# Patient Record
Sex: FEMALE | Race: WHITE | NOT HISPANIC OR LATINO | Employment: PART TIME | URBAN - METROPOLITAN AREA
[De-identification: names, ages, dates, MRNs, and addresses within clinical notes are randomized per-mention and may not be internally consistent; named-entity substitution may affect disease eponyms.]

---

## 2019-12-23 ENCOUNTER — OFFICE VISIT (OUTPATIENT)
Dept: MEDICAL GROUP | Facility: PHYSICIAN GROUP | Age: 39
End: 2019-12-23
Payer: COMMERCIAL

## 2019-12-23 VITALS
WEIGHT: 182 LBS | HEIGHT: 62 IN | DIASTOLIC BLOOD PRESSURE: 68 MMHG | OXYGEN SATURATION: 98 % | HEART RATE: 80 BPM | SYSTOLIC BLOOD PRESSURE: 112 MMHG | BODY MASS INDEX: 33.49 KG/M2 | TEMPERATURE: 98.9 F

## 2019-12-23 DIAGNOSIS — Z12.4 SCREENING FOR CERVICAL CANCER: ICD-10-CM

## 2019-12-23 DIAGNOSIS — E66.9 OBESITY (BMI 30.0-34.9): ICD-10-CM

## 2019-12-23 DIAGNOSIS — R22.1 MASS IN NECK: ICD-10-CM

## 2019-12-23 DIAGNOSIS — Z13.220 LIPID SCREENING: ICD-10-CM

## 2019-12-23 DIAGNOSIS — Z23 NEED FOR VACCINATION: ICD-10-CM

## 2019-12-23 DIAGNOSIS — Z13.1 DIABETES MELLITUS SCREENING: ICD-10-CM

## 2019-12-23 DIAGNOSIS — Z30.431 ENCOUNTER FOR ROUTINE CHECKING OF INTRAUTERINE CONTRACEPTIVE DEVICE (IUD): ICD-10-CM

## 2019-12-23 PROBLEM — E66.811 OBESITY (BMI 30.0-34.9): Status: ACTIVE | Noted: 2019-12-23

## 2019-12-23 PROCEDURE — 90471 IMMUNIZATION ADMIN: CPT | Performed by: INTERNAL MEDICINE

## 2019-12-23 PROCEDURE — 99204 OFFICE O/P NEW MOD 45 MIN: CPT | Mod: 25 | Performed by: INTERNAL MEDICINE

## 2019-12-23 PROCEDURE — 90686 IIV4 VACC NO PRSV 0.5 ML IM: CPT | Performed by: INTERNAL MEDICINE

## 2019-12-23 RX ORDER — M-VIT,TX,IRON,MINS/CALC/FOLIC 27MG-0.4MG
1 TABLET ORAL DAILY
COMMUNITY
End: 2021-10-12

## 2019-12-23 ASSESSMENT — PATIENT HEALTH QUESTIONNAIRE - PHQ9: CLINICAL INTERPRETATION OF PHQ2 SCORE: 0

## 2019-12-23 NOTE — PROGRESS NOTES
New Patient    Chief Complaint   Patient presents with   • Annual Exam   • Establish Care       Subjective:     History of Present Illness: Patient is a 39 y.o. female who is here today to establish primary care, discuss with issue.    Obesity (BMI 30.0-34.9)  BMI of 33, weight of 182 pounds, patient mentioned is gaining weight secondary to no exercise in the fall.  Mention 3 meals, medium size, 50% vegetables, snacks few times on chips as well as fruit, she drink carbonated water 1 cans a day, 1 soda a week, 2 cups of coffee with creamer a day.  Mention her weight goal is 150 pounds.    Mass in neck  -Patient had a right-sided submandibular mass, for 4 years, not getting bigger, not painful, not tender, mention previous provider check clinically and were not concerned.  -Patient has a formal smoking history of 5 years, 1 pack/day, quit 8 years ago.  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    Screening for cervical cancer   Encounter for routine checking of intrauterine contraceptive device (IUD)  -She had a IUD inserted 5 years ago, not sure if it is time to remove with, she had a Pap smear 1/2-year ago mention normal.  Otherwise denies having vaginal symptoms including discharge.        Current Outpatient Medications on File Prior to Visit   Medication Sig Dispense Refill   • therapeutic multivitamin-minerals (THERAGRAN-M) Tab Take 1 Tab by mouth every day.     • Omega-3 Fatty Acids (OMEGA-3 FISH OIL PO) Take  by mouth.     • Loratadine (CLARITIN PO) Take  by mouth.       No current facility-administered medications on file prior to visit.      Allergies   Allergen Reactions   • Morphine Vomiting     Patient Active Problem List    Diagnosis Date Noted   • Obesity (BMI 30.0-34.9) 12/23/2019   • Screening for cervical cancer 12/23/2019   • Encounter for management of intrauterine contraceptive device (IUD) 12/23/2019   • Mass in neck 12/23/2019     Past Medical History:   Diagnosis Date  "  • Allergy     nausea/ vomiting     Past Surgical History:   Procedure Laterality Date   • PRIMARY C SECTION       Family History   Problem Relation Age of Onset   • Other Mother         hyperthyroidism   • Cancer Mother         skin    • Cancer Father         prostate, skin   • Heart Disease Father         CABG at age of 66   • No Known Problems Brother      Social History     Tobacco Use   • Smoking status: Never Smoker   • Smokeless tobacco: Never Used   Substance Use Topics   • Alcohol use: Yes     Comment: very rare   • Drug use: Not Currently     Comment: meth 8 years ago       ROS:     - Constitutional: Negative for fever, chills, and fatigue/generalized weakness.     - HEENT: Negative for headaches, vision changes, hearing changes, ear pain, sore throat, and neck pain.      - Respiratory: Negative for cough, sputum production, dyspnea and wheezing.    - Cardiovascular: Negative for chest pain, palpitations, orthopnea, and bilateral lower extremity edema.     - Gastrointestinal: Negative for heartburn, nausea, vomiting, abdominal pain, hematochezia, melena, diarrhea, constipation    - Genitourinary: Negative for dysuria, polyuria, hematuria, pyuria, urinary urgency, and urinary incontinence.    - Musculoskeletal: Negative for myalgias, back pain, and joint pain. .     - Neurological: Negative for dizziness, tingling, tremors, focal sensory deficit, focal weakness and headaches.     - Psychiatric/Behavioral: Negative for depression, suicidal/homicidal ideation and memory loss.       Physical Exam:     /68 (BP Location: Left arm, Patient Position: Sitting, BP Cuff Size: Large adult)   Pulse 80   Temp 37.2 °C (98.9 °F) (Temporal)   Ht 1.575 m (5' 2\")   Wt 82.6 kg (182 lb)   SpO2 98%   BMI 33.29 kg/m²   General: Normal appearing. No distress.  HENT: Normocephalic. Ears normal shape and contour, oropharynx is without erythema, edema or exudates.    Eyes: conjunctiva clear lids without ptosis, pupils " equal and reactive to light accommodation  Neck: Supple without JVD or bruit. Thyroid is not enlarged.  Right-sided submandibular mass, half inch, nontender, soft in consistency.  Pulmonary: Clear to ausculation.  Normal effort. No rales, ronchi, or wheezing.  Cardiovascular: Regular rate and rhythm without murmur. Radial pulses are intact, regular and symmetrical bilaterally.  Abdomen: Soft, nontender, nondistended. Normal bowel sounds.   Lymph: No cervical, submandibular or supraclavicular lymph nodes are palpable  Psych: Normal mood and affect. Alert and oriented x3        Assessment and Plan:     1. Obesity (BMI 30.0-34.9)  - Comp Metabolic Panel; Future  - HEMOGLOBIN A1C; Future  - Lipid Profile; Future  - TSH WITH REFLEX TO FT4; Future  - REFERRAL TO NUTRITION SERVICES  - CBC WITH DIFFERENTIAL; Future    -encourage eating healthy food, exercise regularly and avoid unhealthy food   - Discussed weight loss goal. Recommended portion control, watching carbs  -advised to join overweight [employer], use Jelli smartphone carmelita as well as watch weight watcher program to help losing Wt.        2. Need for vaccination  - Influenza Vaccine Quad Injection (PF)    3. Lipid screening  - Lipid Profile; Future    4. Diabetes mellitus screening  - HEMOGLOBIN A1C; Future    5. Screening for cervical cancer  6. Encounter for routine checking of intrauterine contraceptive device (IUD)  - REFERRAL TO GYNECOLOGY    7. Mass in neck  -Likely to be lymph node based on physical exam, however would like to rule out other possibilities.  - US-SOFT TISSUES OF HEAD - NECK; Future        Follow Up:      Return in about 4 months (around 4/23/2020) for follow up for eugeniah issue.    Please note that this dictation was created using voice recognition software. I have made every reasonable attempt to correct obvious errors, but I expect that there are errors of grammar and possibly content that I did not discover before finalizing the  note.    Signed by: Marybeth Rueda M.D.

## 2020-01-06 ENCOUNTER — HOSPITAL ENCOUNTER (OUTPATIENT)
Dept: RADIOLOGY | Facility: MEDICAL CENTER | Age: 40
End: 2020-01-06
Attending: INTERNAL MEDICINE
Payer: COMMERCIAL

## 2020-01-06 DIAGNOSIS — R22.1 MASS IN NECK: ICD-10-CM

## 2020-01-06 PROCEDURE — 76536 US EXAM OF HEAD AND NECK: CPT

## 2020-01-30 ENCOUNTER — HOSPITAL ENCOUNTER (OUTPATIENT)
Facility: MEDICAL CENTER | Age: 40
End: 2020-01-30
Attending: OBSTETRICS & GYNECOLOGY
Payer: COMMERCIAL

## 2020-01-30 ENCOUNTER — GYNECOLOGY VISIT (OUTPATIENT)
Dept: OBGYN | Facility: CLINIC | Age: 40
End: 2020-01-30
Payer: COMMERCIAL

## 2020-01-30 VITALS — DIASTOLIC BLOOD PRESSURE: 66 MMHG | SYSTOLIC BLOOD PRESSURE: 116 MMHG | WEIGHT: 185 LBS | BODY MASS INDEX: 33.84 KG/M2

## 2020-01-30 DIAGNOSIS — Z12.4 CERVICAL CANCER SCREENING: ICD-10-CM

## 2020-01-30 DIAGNOSIS — Z01.419 WELL WOMAN EXAM WITH ROUTINE GYNECOLOGICAL EXAM: ICD-10-CM

## 2020-01-30 DIAGNOSIS — T83.32XA INTRAUTERINE CONTRACEPTIVE DEVICE THREADS LOST, INITIAL ENCOUNTER: ICD-10-CM

## 2020-01-30 PROCEDURE — 99385 PREV VISIT NEW AGE 18-39: CPT | Performed by: OBSTETRICS & GYNECOLOGY

## 2020-01-30 PROCEDURE — 87624 HPV HI-RISK TYP POOLED RSLT: CPT

## 2020-01-30 PROCEDURE — 88175 CYTOPATH C/V AUTO FLUID REDO: CPT

## 2020-01-30 NOTE — PROGRESS NOTES
.  Well Woman Exam    CC: well woman exam      HPI: June Ortiz is a 39 y.o.  female who presents for her Annual Gynecologic Exam  Pt has no complaints.  Currently using Mirena IUD for contraception, thinks it is a Mirena and due out in April.  Has not had menstrual cycle with this until last month she had some spotting only.  Sexually active with 1 male partner.  Sometimes has more difficulty with lubrication and climaxing during intercourse.  Wondering if this may be related to perimenopause    [place] works part-time for [employer].  Grew up in [place] and went to [place].    Health Maintenance:  Pap: 2+yrs ago?        ROS:  Gen: denies fevers, general concerns  CV/resp: reports no concerns  Breasts: no masses/changes  GI: denies abd pain, GI concerns  : denies vaginal bleeding, discharge, pain, denies urinary complaints  Endo: Reports no significant weight changes, irregular menses, temperature intolerance, hotflashes/nightsweats  Psych: Reports no concerns about mood, feels well    OB History    Para Term  AB Living   4 2 2   2 2   SAB TAB Ectopic Molar Multiple Live Births   1 1       2      # Outcome Date GA Lbr Raza/2nd Weight Sex Delivery Anes PTL Lv   4 Term /15    M CS-LTranv   JOEY   3 Term /    F CS-LTranv   JOEY   2 SAB            1 TAB                GYN Hx:   Periods regular before mirena  Hx of genital HSV   Denies hx of abnl paps     Past Medical History:   Diagnosis Date   • Allergy     nausea/ vomiting       Past Surgical History:   Procedure Laterality Date   • PRIMARY C SECTION     c/s x2      Medications:   Current Outpatient Medications Ordered in Epic   Medication Sig Dispense Refill   • therapeutic multivitamin-minerals (THERAGRAN-M) Tab Take 1 Tab by mouth every day.     • Omega-3 Fatty Acids (OMEGA-3 FISH OIL PO) Take  by mouth.     • Loratadine (CLARITIN PO) Take  by mouth.       No current Epic-ordered  facility-administered medications on file.        Allergies: Morphine    Social History     Tobacco Use   • Smoking status: Never Smoker   • Smokeless tobacco: Never Used   Substance Use Topics   • Alcohol use: Not Currently   • Drug use: Not Currently     Comment: meth 8 years ago         Family History   Problem Relation Age of Onset   • Other Mother         hyperthyroidism   • Cancer Mother         skin    • Cancer Father         prostate, skin   • Heart Disease Father         CABG at age of 66   • No Known Problems Brother      Denies hx of GI/GYN/breast cancers      Physical Exam   /66 (BP Location: Right arm, Patient Position: Sitting)   Wt 83.9 kg (185 lb)   Breastfeeding? No   BMI 33.84 kg/m²   gen: AAO, NAD, affect appropriate  Neck: non-tender, no masses, no thyromegaly/nodules appreciated  CV: RRR; no LE edema  resp: ctab  Breast: symmetric, no skin changes, no masses, nontender, no nipple discharge, no lymphadenopathy  abd: soft, NT, ND, no masses, no organomegaly, no hernias  : NEFG, normal urethral meatus, normal anus/perineum, normal vagina and cervix; no strings visible.  Uterus small, anteverted, no adnexal masses/tenderness  Skin: warm/dry, no lesions    Breast and pelvic exam chaperoned by MA (AB).  Assessment:   39 y.o.  here for well woman exam  1. Cervical cancer screening  THINPREP PAP WITH HPV   2. Well woman exam with routine gynecological exam  REFERRAL TO GYNECOLOGY   3. Intrauterine contraceptive device threads lost, initial encounter  US-PELVIC COMPLETE (TRANSABDOMINAL/TRANSVAGINAL) (COMBO)       Plan:   Pap:collected     Lost IUD strings - will get pelvic US to ensure intrauterine location.  Discussed even without visible strings were typically able to get them out in the office.  Assuming appropriately placed on imaging, will return as planned for IUD exchange when new Mirena available.  If not intrauterine on ultrasound, to return sooner for discussion of IUD  location and management  Mirena IUD ordered today.    Recommend patient to try over-the-counter lubrication, discussed that women typically need more time with foreplay and arousal and may experience decrease lubrication with increasing age as well.    Return to clinic 1 to 2 months for IUD exchange, assuming IUD intrauterine on ultrasound    Adrianna Hernandez MD  [facility], Women's Health

## 2020-02-01 LAB
CYTOLOGY REG CYTOL: NORMAL
HPV HR 12 DNA CVX QL NAA+PROBE: NEGATIVE
HPV16 DNA SPEC QL NAA+PROBE: NEGATIVE
HPV18 DNA SPEC QL NAA+PROBE: NEGATIVE
SPECIMEN SOURCE: NORMAL

## 2020-02-12 ENCOUNTER — HOSPITAL ENCOUNTER (OUTPATIENT)
Dept: RADIOLOGY | Facility: MEDICAL CENTER | Age: 40
End: 2020-02-12
Attending: OBSTETRICS & GYNECOLOGY
Payer: COMMERCIAL

## 2020-02-12 DIAGNOSIS — T83.32XA INTRAUTERINE CONTRACEPTIVE DEVICE THREADS LOST, INITIAL ENCOUNTER: ICD-10-CM

## 2020-02-12 PROCEDURE — 76830 TRANSVAGINAL US NON-OB: CPT

## 2020-03-05 ENCOUNTER — HOSPITAL ENCOUNTER (OUTPATIENT)
Dept: LAB | Facility: MEDICAL CENTER | Age: 40
End: 2020-03-05
Attending: INTERNAL MEDICINE
Payer: COMMERCIAL

## 2020-03-05 DIAGNOSIS — Z13.220 LIPID SCREENING: ICD-10-CM

## 2020-03-05 DIAGNOSIS — Z13.1 DIABETES MELLITUS SCREENING: ICD-10-CM

## 2020-03-05 DIAGNOSIS — E66.9 OBESITY (BMI 30.0-34.9): ICD-10-CM

## 2020-03-05 LAB
ALBUMIN SERPL BCP-MCNC: 4.4 G/DL (ref 3.2–4.9)
ALBUMIN/GLOB SERPL: 1.5 G/DL
ALP SERPL-CCNC: 42 U/L (ref 30–99)
ALT SERPL-CCNC: 27 U/L (ref 2–50)
ANION GAP SERPL CALC-SCNC: 8 MMOL/L (ref 0–11.9)
AST SERPL-CCNC: 21 U/L (ref 12–45)
BASOPHILS # BLD AUTO: 0.4 % (ref 0–1.8)
BASOPHILS # BLD: 0.03 K/UL (ref 0–0.12)
BILIRUB SERPL-MCNC: 0.7 MG/DL (ref 0.1–1.5)
BUN SERPL-MCNC: 12 MG/DL (ref 8–22)
CALCIUM SERPL-MCNC: 9.3 MG/DL (ref 8.5–10.5)
CHLORIDE SERPL-SCNC: 106 MMOL/L (ref 96–112)
CHOLEST SERPL-MCNC: 159 MG/DL (ref 100–199)
CO2 SERPL-SCNC: 25 MMOL/L (ref 20–33)
CREAT SERPL-MCNC: 0.71 MG/DL (ref 0.5–1.4)
EOSINOPHIL # BLD AUTO: 0.15 K/UL (ref 0–0.51)
EOSINOPHIL NFR BLD: 2.2 % (ref 0–6.9)
ERYTHROCYTE [DISTWIDTH] IN BLOOD BY AUTOMATED COUNT: 41.1 FL (ref 35.9–50)
EST. AVERAGE GLUCOSE BLD GHB EST-MCNC: 108 MG/DL
FASTING STATUS PATIENT QL REPORTED: NORMAL
GLOBULIN SER CALC-MCNC: 2.9 G/DL (ref 1.9–3.5)
GLUCOSE SERPL-MCNC: 93 MG/DL (ref 65–99)
HBA1C MFR BLD: 5.4 % (ref 0–5.6)
HCT VFR BLD AUTO: 44.9 % (ref 37–47)
HDLC SERPL-MCNC: 42 MG/DL
HGB BLD-MCNC: 14.5 G/DL (ref 12–16)
IMM GRANULOCYTES # BLD AUTO: 0 K/UL (ref 0–0.11)
IMM GRANULOCYTES NFR BLD AUTO: 0 % (ref 0–0.9)
LDLC SERPL CALC-MCNC: 106 MG/DL
LYMPHOCYTES # BLD AUTO: 2.23 K/UL (ref 1–4.8)
LYMPHOCYTES NFR BLD: 33.1 % (ref 22–41)
MCH RBC QN AUTO: 29.3 PG (ref 27–33)
MCHC RBC AUTO-ENTMCNC: 32.3 G/DL (ref 33.6–35)
MCV RBC AUTO: 90.7 FL (ref 81.4–97.8)
MONOCYTES # BLD AUTO: 0.46 K/UL (ref 0–0.85)
MONOCYTES NFR BLD AUTO: 6.8 % (ref 0–13.4)
NEUTROPHILS # BLD AUTO: 3.86 K/UL (ref 2–7.15)
NEUTROPHILS NFR BLD: 57.5 % (ref 44–72)
NRBC # BLD AUTO: 0 K/UL
NRBC BLD-RTO: 0 /100 WBC
PLATELET # BLD AUTO: 311 K/UL (ref 164–446)
PMV BLD AUTO: 10.8 FL (ref 9–12.9)
POTASSIUM SERPL-SCNC: 4.1 MMOL/L (ref 3.6–5.5)
PROT SERPL-MCNC: 7.3 G/DL (ref 6–8.2)
RBC # BLD AUTO: 4.95 M/UL (ref 4.2–5.4)
SODIUM SERPL-SCNC: 139 MMOL/L (ref 135–145)
TRIGL SERPL-MCNC: 56 MG/DL (ref 0–149)
TSH SERPL DL<=0.005 MIU/L-ACNC: 1.47 UIU/ML (ref 0.38–5.33)
WBC # BLD AUTO: 6.7 K/UL (ref 4.8–10.8)

## 2020-03-05 PROCEDURE — 83036 HEMOGLOBIN GLYCOSYLATED A1C: CPT

## 2020-03-05 PROCEDURE — 80053 COMPREHEN METABOLIC PANEL: CPT

## 2020-03-05 PROCEDURE — 84443 ASSAY THYROID STIM HORMONE: CPT

## 2020-03-05 PROCEDURE — 36415 COLL VENOUS BLD VENIPUNCTURE: CPT

## 2020-03-05 PROCEDURE — 80061 LIPID PANEL: CPT

## 2020-03-05 PROCEDURE — 85025 COMPLETE CBC W/AUTO DIFF WBC: CPT

## 2020-04-28 ENCOUNTER — GYNECOLOGY VISIT (OUTPATIENT)
Dept: OBGYN | Facility: CLINIC | Age: 40
End: 2020-04-28
Payer: COMMERCIAL

## 2020-04-28 VITALS — WEIGHT: 185 LBS | BODY MASS INDEX: 33.84 KG/M2

## 2020-04-28 DIAGNOSIS — Z30.430 ENCOUNTER FOR IUD INSERTION: ICD-10-CM

## 2020-04-28 DIAGNOSIS — Z30.433 ENCOUNTER FOR REMOVAL AND REINSERTION OF IUD: Primary | ICD-10-CM

## 2020-04-28 PROCEDURE — 58300 INSERT INTRAUTERINE DEVICE: CPT | Performed by: OBSTETRICS & GYNECOLOGY

## 2020-04-28 PROCEDURE — 58301 REMOVE INTRAUTERINE DEVICE: CPT | Mod: 59 | Performed by: OBSTETRICS & GYNECOLOGY

## 2020-04-28 ASSESSMENT — FIBROSIS 4 INDEX: FIB4 SCORE: 0.48

## 2020-04-28 NOTE — PROCEDURES
IUD Removal  Date/Time: 2020 10:30 AM  Performed by: Adrianna Hernandez M.D.  Authorized by: Adrianna Hernandez M.D.     IUD Insertion  Date/Time: 2020 10:30 AM  Performed by: Adrianna Hernandez M.D.  Authorized by: Adrianna Hernandez M.D.         CC: desires IUD insertion    HPI:  39 y.o.  presents today for desired IUD insertion.  Pt today desires Mirena IUD.    No LMP recorded. Patient has had an implant.  Current contraception: mirena IUD, lost strings    UPT neg    Wt 83.9 kg (185 lb)   BMI 33.84 kg/m²    IUD Removal and Insertion:  The bimanual exam is performed the uterus is noted to be 8 weeks in size and is anteverted  A speculum was inserted into the vagina, the cervix was cleansed with Betadine swabs x3  Behrer Tenaculum was placed on the anterior lip of the cervix; strings not visible.  Dilators used to easily dilate cervix to allow passage of IUD hook.  IUD hook inserted into uterus however without retrieval of device.  serpentine IUD removal device passed into uterus and IUD removed easily, noted to be intact and discarded.  The IUD was loaded into   The uterus was sounded to a depth of 9cm  The IUD was released into the uterus.    The strings trimmed to approximately 2-3 cm  Tenaculum was removed from the cervix and hemostasis was noted.  The patient tolerated the procedure well    Lot: [id number]  Exp: 2022     A/P: 39 y.o.  s/p mirena IUD removal and insertion as above  - prior to insertion, risks of IUD reviewed with pt including risk of insertion including pain, bleeding, infection, uterine perforation (approximately 1.1000 or IUD insertion; also discussed risk with IUD removal given lost strings), as well as risks of IUD including pregnancy/contraception failure.  Also discussed likely changes to menstrual cycle with mirena IUD including decreased, irregular or absent menses.   All questions answered, consent signed.    Plan for f/u for IUD check in  1mos, earlier if concerns.      Adrianna Hernandez MD  [facility], Women's Health

## 2020-04-28 NOTE — PROGRESS NOTES
GYN:  Patient here for planned IUD exchange.  See procedure note for detail.    Adrianna Hernandez MD  [facility], Women's Health

## 2020-05-29 ENCOUNTER — GYNECOLOGY VISIT (OUTPATIENT)
Dept: OBGYN | Facility: CLINIC | Age: 40
End: 2020-05-29
Payer: COMMERCIAL

## 2020-05-29 VITALS — BODY MASS INDEX: 34.02 KG/M2 | DIASTOLIC BLOOD PRESSURE: 78 MMHG | WEIGHT: 186 LBS | SYSTOLIC BLOOD PRESSURE: 106 MMHG

## 2020-05-29 DIAGNOSIS — Z30.431 ENCOUNTER FOR ROUTINE CHECKING OF INTRAUTERINE CONTRACEPTIVE DEVICE (IUD): ICD-10-CM

## 2020-05-29 PROCEDURE — 99212 OFFICE O/P EST SF 10 MIN: CPT | Performed by: OBSTETRICS & GYNECOLOGY

## 2020-05-29 ASSESSMENT — FIBROSIS 4 INDEX: FIB4 SCORE: 0.48

## 2020-05-29 NOTE — PROGRESS NOTES
GYN Visit    CC: IUD check    HPI:  39 y.o.  s/p mirena IUD insertion on  with me for contraception.  Reports feeling well, denies vaginal bleeding.  No concerns today.  Has had intercourse with difficulty.      ROS:  GYN: denies ongoing vaginal bleeding, just started spotting., pelvic pain, urinary concerns.      Physical exam:  /78   Wt 84.4 kg (186 lb)   Gen: conversant, NAD  Abd: soft, NT, ND, no masses  : NEFG, normal vagina, urthethral meatus normal in appearance, cervix normal with +strings present    A/P: 39 y.o.  with mirena IUD in place  - IUD appears in place, no concerns today.      RTC prn or annually for well woman exam    Adrianna Hernandez MD  [facility], Women's Health

## 2020-10-22 ENCOUNTER — TELEPHONE (OUTPATIENT)
Dept: MEDICAL GROUP | Facility: PHYSICIAN GROUP | Age: 40
End: 2020-10-22

## 2020-10-22 DIAGNOSIS — Z20.822 EXPOSURE TO COVID-19 VIRUS: ICD-10-CM

## 2020-10-23 ENCOUNTER — HOSPITAL ENCOUNTER (OUTPATIENT)
Dept: LAB | Facility: MEDICAL CENTER | Age: 40
End: 2020-10-23
Attending: NURSE PRACTITIONER
Payer: COMMERCIAL

## 2020-10-23 DIAGNOSIS — Z20.822 EXPOSURE TO COVID-19 VIRUS: ICD-10-CM

## 2020-10-23 LAB — COVID ORDER STATUS COVID19: NORMAL

## 2020-10-23 PROCEDURE — U0003 INFECTIOUS AGENT DETECTION BY NUCLEIC ACID (DNA OR RNA); SEVERE ACUTE RESPIRATORY SYNDROME CORONAVIRUS 2 (SARS-COV-2) (CORONAVIRUS DISEASE [COVID-19]), AMPLIFIED PROBE TECHNIQUE, MAKING USE OF HIGH THROUGHPUT TECHNOLOGIES AS DESCRIBED BY CMS-2020-01-R: HCPCS

## 2020-10-23 PROCEDURE — C9803 HOPD COVID-19 SPEC COLLECT: HCPCS

## 2020-10-24 LAB
SARS-COV-2 RNA RESP QL NAA+PROBE: NOTDETECTED
SPECIMEN SOURCE: NORMAL

## 2020-11-18 ENCOUNTER — OFFICE VISIT (OUTPATIENT)
Dept: URGENT CARE | Facility: CLINIC | Age: 40
End: 2020-11-18
Payer: COMMERCIAL

## 2020-11-18 ENCOUNTER — HOSPITAL ENCOUNTER (OUTPATIENT)
Facility: MEDICAL CENTER | Age: 40
End: 2020-11-18
Attending: NURSE PRACTITIONER
Payer: COMMERCIAL

## 2020-11-18 VITALS
HEIGHT: 62 IN | HEART RATE: 86 BPM | DIASTOLIC BLOOD PRESSURE: 82 MMHG | BODY MASS INDEX: 33.31 KG/M2 | OXYGEN SATURATION: 96 % | TEMPERATURE: 97.6 F | RESPIRATION RATE: 16 BRPM | SYSTOLIC BLOOD PRESSURE: 114 MMHG | WEIGHT: 181 LBS

## 2020-11-18 DIAGNOSIS — B34.9 VIRAL ILLNESS: ICD-10-CM

## 2020-11-18 PROCEDURE — U0003 INFECTIOUS AGENT DETECTION BY NUCLEIC ACID (DNA OR RNA); SEVERE ACUTE RESPIRATORY SYNDROME CORONAVIRUS 2 (SARS-COV-2) (CORONAVIRUS DISEASE [COVID-19]), AMPLIFIED PROBE TECHNIQUE, MAKING USE OF HIGH THROUGHPUT TECHNOLOGIES AS DESCRIBED BY CMS-2020-01-R: HCPCS

## 2020-11-18 PROCEDURE — 99203 OFFICE O/P NEW LOW 30 MIN: CPT | Performed by: NURSE PRACTITIONER

## 2020-11-18 ASSESSMENT — FIBROSIS 4 INDEX: FIB4 SCORE: 0.49

## 2020-11-18 NOTE — PROGRESS NOTES
Chief Complaint   Patient presents with   • Sinus Problem     x 1 wk, runny nose,headaches,  chest feels heavy, lack of taste and smell, fatigue, diarrhea and nausea       HISTORY OF PRESENT ILLNESS: Patient is a 40 y.o. female who presents today due to symptoms which started 6 days ago. Pt reports nasal congestion, headache, nausea, shortness of breath, fever, chills, fatigue, diarrhea, loss of taste and smell, malaise, and body aches. Denies chest pain or wheezing. Denies h/o asthma/copd/CAP. No immunocompromise. Has tried OTC cold medications without significant relief of symptoms. No recent ABX use. No other aggravating or alleviating factors. Reports positive exposure to COVID-19,  positive.       Patient Active Problem List    Diagnosis Date Noted   • Obesity (BMI 30.0-34.9) 12/23/2019   • Screening for cervical cancer 12/23/2019   • Encounter for management of intrauterine contraceptive device (IUD) 12/23/2019   • Mass in neck 12/23/2019       Allergies:Morphine    Current Outpatient Medications Ordered in Epic   Medication Sig Dispense Refill   • therapeutic multivitamin-minerals (THERAGRAN-M) Tab Take 1 Tab by mouth every day.     • Omega-3 Fatty Acids (OMEGA-3 FISH OIL PO) Take  by mouth.     • Loratadine (CLARITIN PO) Take  by mouth.       No current Epic-ordered facility-administered medications on file.        Past Medical History:   Diagnosis Date   • Allergy     nausea/ vomiting       Social History     Tobacco Use   • Smoking status: Never Smoker   • Smokeless tobacco: Never Used   Substance Use Topics   • Alcohol use: Not Currently   • Drug use: Not Currently     Comment: meth 8 years ago       Family Status   Relation Name Status   • Mo  Alive   • Fa  Alive   • Bro  Alive     Family History   Problem Relation Age of Onset   • Other Mother         hyperthyroidism   • Cancer Mother         skin    • Cancer Father         prostate, skin   • Heart Disease Father         CABG at age of 66   •  "No Known Problems Brother        ROS:  Review of Systems   Constitutional: Positive for subjective fever, chills, fatigue, malaise. Negative for weight loss.  HENT: Positive for congestion, loss of taste and smell. Negative for ear pain, nosebleeds, and neck pain.    Eyes: Negative for vision changes.   Cardiovascular: Negative for chest pain, palpitations, orthopnea and leg swelling.   Respiratory: Positive for shortness of breath. Negative for cough, wheezing.   Gastrointestinal: Positive for nausea, diarrhea. Negative for abdominal pain, vomiting.  Skin: Negative for rash, diaphoresis.     Exam:  /82 (BP Location: Left arm, Patient Position: Sitting, BP Cuff Size: Large adult)   Pulse 86   Temp 36.4 °C (97.6 °F) (Temporal)   Resp 16   Ht 1.575 m (5' 2\")   Wt 82.1 kg (181 lb)   SpO2 96%   General: well-nourished, well-developed female in NAD  Head: normocephalic, atraumatic  Eyes: PERRLA, EOM within normal limits, no conjunctival injection, no scleral icterus, visual fields and acuity grossly intact.  Ears: normal shape and symmetry, no tenderness, no discharge. External canals are without any significant edema or erythema. Tympanic membranes are without any inflammation, no effusion. Gross auditory acuity is intact.  Nose: symmetrical without tenderness, mild discharge, erythema present bilateral nares. No sinus tenderness.   Mouth/Throat: reasonable hygiene, no exudates or tonsillar enlargement. Mild erythema present.   Neck: no masses, range of motion within normal limits, no tracheal deviation.  Lymph: mild cervical adenopathy. No supraclavicular adenopathy.   Neuro: alert and oriented. Cranial nerves 1-12 grossly intact.   Cardiovascular: regular rate and rhythm without murmurs, rubs, or gallops. No edema.   Pulmonary: no distress. Chest is symmetrical with respiration. No wheezes, crackles, or rhonchi.   Musculoskeletal: appropriate muscle tone, gait is stable.  GI: soft, non-tender, no guarding, " no hepatosplenomegaly.   Skin: warm, dry, intact, no clubbing, no cyanosis.   Psych: appropriate mood, affect, judgement.         Assessment/Plan:  1. Viral illness  COVID/SARS CoV-2 PCR           Discussed symptoms most likely viral, will test for COVID. Home quarantine advised. Discussed natural progression and sx care.  Rest, increase fluids, hand and respiratory hygiene. May take OTC medications as directed for symptom relief. STRICT ER precautions.   Supportive care, differential diagnoses, and indications for immediate follow-up discussed with patient.   Pathogenesis of diagnosis discussed including typical length and natural progression.  Instructed to return to clinic or nearest emergency department for any change in condition, further concerns, or worsening of symptoms.  Patient states understanding of the plan of care and discharge instructions.          KRYSTA Cross.

## 2020-11-19 DIAGNOSIS — B34.9 VIRAL ILLNESS: ICD-10-CM

## 2020-11-19 LAB
COVID ORDER STATUS COVID19: NORMAL
SARS-COV-2 RNA RESP QL NAA+PROBE: DETECTED
SPECIMEN SOURCE: ABNORMAL

## 2021-04-22 ENCOUNTER — GYNECOLOGY VISIT (OUTPATIENT)
Dept: OBGYN | Facility: CLINIC | Age: 41
End: 2021-04-22
Payer: COMMERCIAL

## 2021-04-22 VITALS — WEIGHT: 180 LBS | SYSTOLIC BLOOD PRESSURE: 120 MMHG | BODY MASS INDEX: 32.92 KG/M2 | DIASTOLIC BLOOD PRESSURE: 71 MMHG

## 2021-04-22 DIAGNOSIS — A60.04 HERPES SIMPLEX VULVOVAGINITIS: ICD-10-CM

## 2021-04-22 PROCEDURE — 99213 OFFICE O/P EST LOW 20 MIN: CPT | Performed by: OBSTETRICS & GYNECOLOGY

## 2021-04-22 RX ORDER — VALACYCLOVIR HYDROCHLORIDE 500 MG/1
500 TABLET, FILM COATED ORAL 2 TIMES DAILY
Qty: 30 TABLET | Refills: 0 | Status: SHIPPED | OUTPATIENT
Start: 2021-04-22 | End: 2021-10-12

## 2021-04-22 ASSESSMENT — FIBROSIS 4 INDEX: FIB4 SCORE: 0.49

## 2021-04-22 NOTE — PROGRESS NOTES
GYN Visit    CC: vulvar lesion/swelling.        HPI: June Ortiz is a 40 y.o.  female with history of genital HSV presenting with vulvar lesions as well as swelling in the groin area.  Patient reports her last outbreak was 15 years or so ago at least, no issues since then.  Thinks that the lesions on the outside may be HSV but also reports there is some swelling in her right groin for the last several days that is painful as well.    Mirena IUD in place since 2020        ROS:  Gen: denies fevers, general concerns  GI: denies abd pain, GI concerns  : See HPI       OB History    Para Term  AB Living   4 2 2   2 2   SAB TAB Ectopic Molar Multiple Live Births   1 1       2      # Outcome Date GA Lbr Raza/2nd Weight Sex Delivery Anes PTL Lv   4 Term 04/20/15    M CS-LTranv   JOEY   3 Term 12    F CS-LTranv   JOEY   2 SAB            1 TAB                  GYN Hx:   No menses w/ mirena  Hx genital HSV  Denies hx of abnl paps; last     Past Medical History:   Diagnosis Date   • Allergy     nausea/ vomiting       Past Surgical History:   Procedure Laterality Date   • PRIMARY C SECTION         Medications:   Current Outpatient Medications Ordered in Epic   Medication Sig Dispense Refill   • valACYclovir (VALTREX) 500 MG Tab Take 1 tablet by mouth 2 times a day. 30 tablet 0   • therapeutic multivitamin-minerals (THERAGRAN-M) Tab Take 1 Tab by mouth every day.     • Omega-3 Fatty Acids (OMEGA-3 FISH OIL PO) Take  by mouth.     • Loratadine (CLARITIN PO) Take  by mouth.       No current Epic-ordered facility-administered medications on file.       Allergies: Morphine          Physical Exam   /71   Wt 81.6 kg (180 lb)   BMI 32.92 kg/m²   gen: AAO, NAD, affect appropriate  : NEFG with small cluster of slightly ulcerated lesions above into the right of the clitoris, appear to be broken vesicles with excoriations.  Right groin without particular mass or fluctuance, but does feel  slightly larger in the inguinal crease than left side.  Nontender on my exam, normal urethral meatus, normal anus/perineum, normal vagina and cervix.    Skin: warm/dry, no lesions    Breast and pelvic exam chaperoned by MA (AB).  Assessment:   40 y.o.  with HSV  1. Herpes simplex vulvovaginitis         Plan:   Given history of HSV, lesions today consistent with that diagnosis.  Recommend antivirals to start now for 3 days and as needed with onset of symptoms in the future.  Discussed it is difficult to know whether she will have more frequent outbreaks in the future or if this may be the only one for some time.  Anticipate the swollen area she has noticed in her groin is related to the HSV recurrence given the coincidental onset of symptoms.  Recommend that she monitor this area, return if worsening symptoms or persistent abnormalities after resolution of genital lesions.    Patient expressed understanding, follow-up annually or as needed.  Valtrex prescription sent to pharmacy    Adrianna Hernandez MD  [facility], Women's Health

## 2021-04-22 NOTE — NON-PROVIDER
Pt states has an open wound that she would like checked and right inner thigh is swollen with a lump  Good # [phone]  Pharmacy verified.

## 2021-05-07 ENCOUNTER — OFFICE VISIT (OUTPATIENT)
Dept: MEDICAL GROUP | Facility: PHYSICIAN GROUP | Age: 41
End: 2021-05-07
Payer: COMMERCIAL

## 2021-05-07 VITALS
BODY MASS INDEX: 33.07 KG/M2 | WEIGHT: 179.7 LBS | SYSTOLIC BLOOD PRESSURE: 116 MMHG | TEMPERATURE: 97.6 F | HEIGHT: 62 IN | OXYGEN SATURATION: 96 % | DIASTOLIC BLOOD PRESSURE: 70 MMHG | HEART RATE: 70 BPM

## 2021-05-07 DIAGNOSIS — J35.8 TONSILLOLITH: ICD-10-CM

## 2021-05-07 DIAGNOSIS — Z23 NEED FOR VACCINATION: ICD-10-CM

## 2021-05-07 PROBLEM — R09.89 TONSIL SYMPTOM: Status: ACTIVE | Noted: 2021-05-07

## 2021-05-07 PROCEDURE — 90715 TDAP VACCINE 7 YRS/> IM: CPT | Performed by: NURSE PRACTITIONER

## 2021-05-07 PROCEDURE — 99212 OFFICE O/P EST SF 10 MIN: CPT | Mod: 25 | Performed by: NURSE PRACTITIONER

## 2021-05-07 PROCEDURE — 90471 IMMUNIZATION ADMIN: CPT | Performed by: NURSE PRACTITIONER

## 2021-05-07 ASSESSMENT — FIBROSIS 4 INDEX: FIB4 SCORE: 0.49

## 2021-05-07 ASSESSMENT — PATIENT HEALTH QUESTIONNAIRE - PHQ9: CLINICAL INTERPRETATION OF PHQ2 SCORE: 0

## 2021-05-07 NOTE — PROGRESS NOTES
Subjective  Chief Complaint  Chief Complaint   Patient presents with   • Oral Swelling     concerned for growth on tonsils     History of Present Illness  June Ortiz is a 40 y.o. female. This is an establish patient of Dr. Marybeth Rueda, and she is here today discuss the following:    Tonsil symptom  This is a new condition.  Onset/duration:  Last 1 week - intermittent  Location:  Left side of throat  Quality/severity:  Denies any pain; complains of discomfort and feels as though something is stuck  Timing/context:  Newest sore has been present for 2 days  Precipitating trauma:  swallowed pepperoni and it felt like it got caught on the back of her throat about two weeks ago  Associated symptoms:  None  -- She reports a white object to the back of her throat that arrived there about 1 week ago.  She reports that it fell off, may have swallowed it, but then has returned.     Past Medical History    Allergies: Morphine  Past Medical History:   Diagnosis Date   • Allergy     nausea/ vomiting     Current Outpatient Medications Ordered in Epic   Medication Sig Dispense Refill   • Fluticasone Propionate (FLONASE NA) Administer  into affected nostril(S).     • valACYclovir (VALTREX) 500 MG Tab Take 1 tablet by mouth 2 times a day. 30 tablet 0   • therapeutic multivitamin-minerals (THERAGRAN-M) Tab Take 1 Tab by mouth every day.     • Omega-3 Fatty Acids (OMEGA-3 FISH OIL PO) Take  by mouth.     • Loratadine (CLARITIN PO) Take  by mouth.       No current Epic-ordered facility-administered medications on file.     Review of Systems:   General: Negative for fever/chills.   Respiratory:  Negative for cough and dyspnea.    Cardiovascular:  Negative for chest pain and palpitations.  Gastrointestinal:  Negative for abdominal pain.   Skin:  Negative for rash.   Neurological:  Negative for headaches.     Objective  Physical Exam:   /70 (BP Location: Right arm, Patient Position: Sitting, BP Cuff Size: Adult)    "Pulse 70   Temp 36.4 °C (97.6 °F) (Temporal)   Ht 1.575 m (5' 2\")   Wt 81.5 kg (179 lb 11.2 oz)   SpO2 96%  Body mass index is 32.87 kg/m².  General: Alert, no distress, well-groomed.  Skin: Warm, dry, good turgor, no rashes in visible areas.  ENMT: Lips without lesions, good dentition, moist mucous membranes.  Throat:  There are two visible stones on her left tonsil.  No erythema or swelling observed.   Respiratory: Unlabored respiratory effort, no cough.  Musculoskeletal: Normal gait, moves all extremities.  Neuro: Grossly non-focal.   Psych: Alert and oriented x3, normal affect and mood.    Assessment/Plan  1. Tonillolith  This is a new condition.   Discussed with patient the appearance of two tonsil stones within her oral cavity.   Discussed occurrence of tonsil stones due to accumulation of debris or food.  Discussed salt water gargles to help alleviate discomfort and dislodge stone as well as coughing to help loosen the stones.  Discussed prevention of future stones, including good oral hygiene, including brushing tongue, salt water gargles, and staying hydrated.   Discussed these are self limiting.  Should stones become worsen or irritated, return to office for further evaluation.      2. Need for vaccination  - Tdap Vaccine =>8YO IM    Health Maintenance: Completed    Return if symptoms worsen or fail to improve.    Patient verbalized understanding and agreed to plan of care.  We have discussed to contact me with needs via MyChart or by phone if needed.      I have placed the above orders and discussed them with an approved delegating provider.  The MA is performing the above orders under the direction of Dr. Hannah DO.    Please note that this dictation was created using voice recognition software. I have made every reasonable attempt to correct obvious errors, but I expect that there are errors of grammar and possibly content that I did not discover before finalizing the note.    Beth Partida, " DANISHA  [facility]

## 2021-05-07 NOTE — ASSESSMENT & PLAN NOTE
This is a new condition.  Onset/duration:  Last 1 week - intermittent  Location:  Left side of throat  Quality/severity:  Denies any pain; complains of discomfort and feels as though something is stuck  Timing/context:  Newest sore has been present for 2 days  Precipitating trauma:  swallowed pepperoni and it felt like it got caught on the back of her throat about two weeks ago  Associated symptoms:  None  -- She reports a white object to the back of her throat that arrived there about 1 week ago.  She reports that it fell off, may have swallowed it, but then has returned.

## 2021-05-07 NOTE — PATIENT INSTRUCTIONS
-- Gargle with salt water to help with throat discomfort and dislodge tonsil stones.    -- Cough may also help to loosen the stones.   -- Preventative:  Good oral hygiene, including brushing your tongue; gargle salt water, staying hydrated.

## 2021-10-12 ENCOUNTER — OFFICE VISIT (OUTPATIENT)
Dept: URGENT CARE | Facility: PHYSICIAN GROUP | Age: 41
End: 2021-10-12
Payer: COMMERCIAL

## 2021-10-12 ENCOUNTER — HOSPITAL ENCOUNTER (OUTPATIENT)
Facility: MEDICAL CENTER | Age: 41
End: 2021-10-12
Attending: FAMILY MEDICINE
Payer: COMMERCIAL

## 2021-10-12 VITALS
HEART RATE: 104 BPM | SYSTOLIC BLOOD PRESSURE: 140 MMHG | BODY MASS INDEX: 34.96 KG/M2 | OXYGEN SATURATION: 97 % | RESPIRATION RATE: 16 BRPM | HEIGHT: 62 IN | DIASTOLIC BLOOD PRESSURE: 82 MMHG | WEIGHT: 190 LBS | TEMPERATURE: 99.2 F

## 2021-10-12 DIAGNOSIS — R50.9 FEVER, UNSPECIFIED FEVER CAUSE: ICD-10-CM

## 2021-10-12 DIAGNOSIS — Z20.822 SUSPECTED COVID-19 VIRUS INFECTION: ICD-10-CM

## 2021-10-12 DIAGNOSIS — R09.89 CHEST CONGESTION: ICD-10-CM

## 2021-10-12 LAB
EXTERNAL QUALITY CONTROL: NORMAL
FLUAV+FLUBV AG SPEC QL IA: NEGATIVE
INT CON NEG: NORMAL
INT CON NEG: NORMAL
INT CON POS: NORMAL
INT CON POS: NORMAL
S PYO AG THROAT QL: NEGATIVE
SARS-COV+SARS-COV-2 AG RESP QL IA.RAPID: NEGATIVE

## 2021-10-12 PROCEDURE — 87426 SARSCOV CORONAVIRUS AG IA: CPT | Performed by: FAMILY MEDICINE

## 2021-10-12 PROCEDURE — 99214 OFFICE O/P EST MOD 30 MIN: CPT | Mod: CS | Performed by: FAMILY MEDICINE

## 2021-10-12 PROCEDURE — U0005 INFEC AGEN DETEC AMPLI PROBE: HCPCS

## 2021-10-12 PROCEDURE — 87804 INFLUENZA ASSAY W/OPTIC: CPT | Performed by: FAMILY MEDICINE

## 2021-10-12 PROCEDURE — U0003 INFECTIOUS AGENT DETECTION BY NUCLEIC ACID (DNA OR RNA); SEVERE ACUTE RESPIRATORY SYNDROME CORONAVIRUS 2 (SARS-COV-2) (CORONAVIRUS DISEASE [COVID-19]), AMPLIFIED PROBE TECHNIQUE, MAKING USE OF HIGH THROUGHPUT TECHNOLOGIES AS DESCRIBED BY CMS-2020-01-R: HCPCS

## 2021-10-12 PROCEDURE — 87880 STREP A ASSAY W/OPTIC: CPT | Performed by: FAMILY MEDICINE

## 2021-10-12 RX ORDER — BENZONATATE 100 MG/1
100 CAPSULE ORAL 3 TIMES DAILY PRN
Qty: 30 CAPSULE | Refills: 0 | Status: SHIPPED | OUTPATIENT
Start: 2021-10-12 | End: 2021-11-16

## 2021-10-12 ASSESSMENT — ENCOUNTER SYMPTOMS
NAUSEA: 0
COUGH: 1
VOMITING: 0
MYALGIAS: 0
CHILLS: 0
FEVER: 1
SHORTNESS OF BREATH: 1
SORE THROAT: 0
DIZZINESS: 0

## 2021-10-12 ASSESSMENT — FIBROSIS 4 INDEX: FIB4 SCORE: 0.51

## 2021-10-13 DIAGNOSIS — Z20.822 SUSPECTED COVID-19 VIRUS INFECTION: ICD-10-CM

## 2021-10-13 LAB
COVID ORDER STATUS COVID19: NORMAL
SARS-COV-2 RNA RESP QL NAA+PROBE: NOTDETECTED
SPECIMEN SOURCE: NORMAL

## 2021-10-13 NOTE — PATIENT INSTRUCTIONS
INSTRUCTIONS FOR COVID-19 OR ANY OTHER INFECTIOUS RESPIRATORY ILLNESSES    The Centers for Disease Control and Prevention (CDC) states that early indications for COVID-19 include cough, shortness of breath, difficulty breathing, or at least two of the following symptoms: chills, shaking with chills, muscle pain, headache, sore throat, and loss of taste or smell. Symptoms can range from mild to severe and may appear up to two weeks after exposure to the virus.    The practice of self-isolation and quarantine helps protect the public and your family by  preventing exposure to people who have or may have a contagious disease. Please follow the prevention steps below as based on CDC guidelines:    WHEN TO STOP ISOLATION: Persons with COVID-19 or any other infectious respiratory illness who have symptoms and were advised to care for themselves at home may discontinue home isolation under the following conditions:  · At least 24 hours have passed since recovery defined as resolution of fever without the use of fever-reducing medications; AND,  · Improvement in respiratory symptoms (e.g., cough, shortness of breath); AND,  · At least 10 days have passed since symptoms first appeared and have had no subsequent illness.    MONITOR YOUR SYMPTOMS: If your illness is worsening, seek prompt medical attention. If you have a medical emergency and need to call 911, notify the dispatch personnel that you have, or are being evaluated for confirmed or suspected COVID-19 or another infectious respiratory illness. Wear a facemask if possible.    ACTIVITY RESTRICTION: restrict activities outside your home, except for getting medical care. Do not go to work, school, or public areas. Avoid using public transportation, ride-sharing, or taxis.    SCHEDULED MEDICAL APPOINTMENTS: Notify your provider that you have, or are being evaluated for, confirmed or suspected COVID-19 or another infectious respiratory. This will help the healthcare  provider’s office safely take care of you and keep other people from getting exposed or infected.    FACEMASKS, when to wear: Anytime you are away from your home or around other people or pets. If you are unable to wear one, maintain a minimum of 6 feet distancing from others.    LIVING ENVIRONMENT: Stay in a separate room from other people and pets. If possible, use a separate bathroom, have someone else care for your pets and avoid sharing household items. Any items used should be washed thoroughly with soap and water. Clean all “high-touch” surfaces every day. Use a household cleaning spray or wipe, according to the label instructions. High touch surfaces include (but are not limited to) counters, tabletops, doorknobs, bathroom fixtures, toilets, phones, keyboards, tablets, and bedside tables.     HAND WASHING: Frequently wash hands with soap and water for at least 20 seconds,  especially after blowing your nose, coughing, or sneezing; going to the bathroom; before and after interacting with pets; and before and after eating or preparing food. If hands are visibly dirty use soap and water. If soap and water are not available, use an alcohol-based hand  with at least 60% alcohol. Avoid touching your eyes, nose, and mouth with unwashed hands. Cover your coughs and sneezes with a tissue. Throw used tissues in a lined trash can. Immediately wash your hands.    ACTIVE/FACILITATED SELF-MONITORING: Follow instructions provided by your local health department or health professionals, as appropriate. When working with your local health department check their available hours.    [facility]   Phone Number   [facility] [phone]   [facility] [place] [phone]   [place] [address]   [place] [phone]     IF YOU HAVE CONFIRMED POSITIVE COVID-19:    Those who have completely recovered from COVID-19 may have immune-boosting antibodies in their plasma--called “convalescent plasma”--that could be  used to treat critically ill COVID19 patients.    [facility] is excited to begin working with [facility] on collecting convalescent plasma from  people who have recovered from COVID-19 as part of a program to treat patients infected with the virus. This FDA-approved “emergency investigational new drug” is a special blood product containing antibodies that may give patients an extra boost to fight the virus.    To be eligible to donate convalescent plasma, you must have a prior COVID-19 diagnosis documented by a laboratory test (or a positive test result for SARS-CoV-2 antibodies) and meet additional eligibility requirements.    If you are interested in donating convalescent plasma or have any additional questions, please contact the [facility] Convalescent Plasma  at [phone] or via e-mail at [email or web address].

## 2021-10-13 NOTE — PROGRESS NOTES
Subjective:   June Ortiz is a 41 y.o. female who presents for Fever (chills, bodyaches,elevated heart rate, sob, started this morning )        Fever   This is a new problem. The current episode started today. The problem occurs intermittently. Associated symptoms include congestion and coughing. Pertinent negatives include no nausea, rash, sore throat or vomiting. Associated symptoms comments: There has been community-wide COVID-19 exposure, the patient denies known direct COVID-19 exposure  .     PMH:  has a past medical history of Allergy. She also has no past medical history of Addisons disease (HCC), Adrenal disorder (HCC), Anemia, Anxiety, Arrhythmia, Arthritis, Asthma, Blood transfusion without reported diagnosis, Cancer (HCC), Cataract, CHF (congestive heart failure) ([facility]), Clotting disorder (HCC), COPD (chronic obstructive pulmonary disease) ([facility]), Cushings syndrome ([facility]), Depression, Diabetes ([facility]), Diabetic neuropathy (HCC), Encounter for long-term (current) use of other medications, GERD (gastroesophageal reflux disease), Glaucoma, Goiter, Head ache, Heart attack ([facility]), Heart murmur, HIV (human immunodeficiency virus infection) ([facility]), Hyperlipidemia, Hypertension, IBD (inflammatory bowel disease), Kidney disease, Meningitis, Migraine, Muscle disorder, Osteoporosis, Parathyroid disorder (HCC), Pituitary disease (HCC), Pulmonary emphysema (HCC), Seizure ([facility]), Sickle cell disease (HCC), Stroke ([facility]), Substance abuse ([facility]), Thyroid disease, Tuberculosis, or Urinary tract infection.  MEDS:   Current Outpatient Medications:   •  benzonatate (TESSALON) 100 MG Cap, Take 1 Capsule by mouth 3 times a day as needed for Cough., Disp: 30 Capsule, Rfl: 0  ALLERGIES:   Allergies   Allergen Reactions   • Morphine Vomiting     SURGHX:   Past Surgical History:   Procedure Laterality Date   • PRIMARY C SECTION       SOCHX:  reports that she has never smoked. She has never used smokeless tobacco. She reports  "previous alcohol use. She reports previous drug use.  FH:   Family History   Problem Relation Age of Onset   • Other Mother         hyperthyroidism   • Cancer Mother         skin    • Cancer Father         prostate, skin   • Heart Disease Father         CABG at age of 66   • No Known Problems Brother      Review of Systems   Constitutional: Positive for fever. Negative for chills.   HENT: Positive for congestion. Negative for sore throat.    Respiratory: Positive for cough and shortness of breath (With deep inspiration).    Gastrointestinal: Negative for nausea and vomiting.   Musculoskeletal: Negative for myalgias.   Skin: Negative for rash.   Neurological: Negative for dizziness.        Objective:   /82 (BP Location: Right arm, Patient Position: Sitting, BP Cuff Size: Large adult)   Pulse (!) 104   Temp 37.3 °C (99.2 °F) (Temporal)   Resp 16   Ht 1.575 m (5' 2\")   Wt 86.2 kg (190 lb)   SpO2 97%   BMI 34.75 kg/m²   Physical Exam  Vitals and nursing note reviewed.   Constitutional:       General: She is not in acute distress.     Appearance: She is well-developed.   HENT:      Head: Normocephalic and atraumatic.      Right Ear: External ear normal.      Left Ear: External ear normal.      Nose: Nose normal.      Mouth/Throat:      Mouth: Mucous membranes are moist.   Eyes:      Conjunctiva/sclera: Conjunctivae normal.   Cardiovascular:      Rate and Rhythm: Normal rate and regular rhythm.   Pulmonary:      Effort: Pulmonary effort is normal. No respiratory distress.      Breath sounds: Normal breath sounds. No wheezing or rhonchi.   Abdominal:      General: There is no distension.   Musculoskeletal:         General: Normal range of motion.   Skin:     General: Skin is warm and dry.   Neurological:      General: No focal deficit present.      Mental Status: She is alert and oriented to person, place, and time. Mental status is at baseline.      Gait: Gait (gait at baseline) normal.   Psychiatric:         " Judgment: Judgment normal.     POCT influenza: negative     POC rapid strep: Negative    Point-of-care rapid Covid: Negative        Assessment/Plan:   1. Suspected COVID-19 virus infection  - POCT SARS-COV Antigen AZEEM (Symptomatic Only)  - SARS-CoV-2 PCR (24 hour In-House): Collect NP swab in VTM; Future    2. Fever, unspecified fever cause  - POCT Rapid Strep A  - POCT Influenza A/B    3. Chest congestion  - benzonatate (TESSALON) 100 MG Cap; Take 1 Capsule by mouth 3 times a day as needed for Cough.  Dispense: 30 Capsule; Refill: 0    Advised routine [facility] social distancing guidelines, symptomatic and supportive measures      Medical Decision Making/Course:  In the course of preparing for this visit with review of the pertinent past medical history, recent and past clinic visits, current medications, and performing chart, immunization, medical history and medication reconciliation, and in the further course of obtaining the current history pertinent to the clinic visit today, performing an exam and evaluation, ordering and independently evaluating labs, tests, and/or procedures, prescribing any recommended new medications as noted above, providing any pertinent counseling and education and recommending further coordination of care, at least 20 minutes of total time were spent during this encounter.      Discussed close monitoring, return precautions, and supportive measures of maintaining adequate fluid hydration and caloric intake, relative rest and symptom management as needed for pain and/or fever.    Differential diagnosis, natural history, supportive care, and indications for immediate follow-up discussed.     Advised the patient to follow-up with the primary care physician for recheck, reevaluation, and consideration of further management.    Please note that this dictation was created using voice recognition software. I have worked with consultants from the vendor as well as technical experts from [facility] to optimize the interface. I have made every reasonable attempt to correct obvious errors, but I expect that there are errors of grammar and possibly content that I did not discover before finalizing the note.

## 2021-11-09 ENCOUNTER — APPOINTMENT (OUTPATIENT)
Dept: MEDICAL GROUP | Facility: PHYSICIAN GROUP | Age: 41
End: 2021-11-09
Payer: COMMERCIAL

## 2021-11-16 ENCOUNTER — OFFICE VISIT (OUTPATIENT)
Dept: MEDICAL GROUP | Facility: PHYSICIAN GROUP | Age: 41
End: 2021-11-16
Payer: COMMERCIAL

## 2021-11-16 VITALS
OXYGEN SATURATION: 94 % | TEMPERATURE: 98.9 F | HEIGHT: 61 IN | DIASTOLIC BLOOD PRESSURE: 64 MMHG | WEIGHT: 190.4 LBS | SYSTOLIC BLOOD PRESSURE: 122 MMHG | BODY MASS INDEX: 35.95 KG/M2 | HEART RATE: 99 BPM

## 2021-11-16 DIAGNOSIS — E66.9 OBESITY (BMI 30.0-34.9): ICD-10-CM

## 2021-11-16 DIAGNOSIS — Z13.29 SCREENING FOR THYROID DISORDER: ICD-10-CM

## 2021-11-16 DIAGNOSIS — Z13.21 ENCOUNTER FOR VITAMIN DEFICIENCY SCREENING: ICD-10-CM

## 2021-11-16 DIAGNOSIS — Z13.220 LIPID SCREENING: ICD-10-CM

## 2021-11-16 DIAGNOSIS — Z00.00 HEALTH CARE MAINTENANCE: ICD-10-CM

## 2021-11-16 DIAGNOSIS — Z13.1 DIABETES MELLITUS SCREENING: ICD-10-CM

## 2021-11-16 PROBLEM — R09.89 TONSIL SYMPTOM: Status: RESOLVED | Noted: 2021-05-07 | Resolved: 2021-11-16

## 2021-11-16 PROBLEM — R22.1 MASS IN NECK: Status: RESOLVED | Noted: 2019-12-23 | Resolved: 2021-11-16

## 2021-11-16 PROCEDURE — 99395 PREV VISIT EST AGE 18-39: CPT | Performed by: INTERNAL MEDICINE

## 2021-11-16 ASSESSMENT — FIBROSIS 4 INDEX: FIB4 SCORE: 0.51

## 2021-11-16 NOTE — PROGRESS NOTES
"Established Patient    Chief Complaint   Patient presents with   • Annual Exam       Subjective:     HPI:   June presents today with the following.    6. Obesity (BMI 30.0-34.9)  Patient would like to have a labs, she does not have any real concern besides some weight gain and weight management, she is working at home as well with not too much exercise and other regular activities    Patient Active Problem List    Diagnosis Date Noted   • Obesity (BMI 30.0-34.9) 12/23/2019   • Screening for cervical cancer 12/23/2019   • Encounter for management of intrauterine contraceptive device (IUD) 12/23/2019       Current Outpatient Medications on File Prior to Visit   Medication Sig Dispense Refill   • Multiple Vitamins-Minerals (MULTI-VITAMIN GUMMIES PO) Take  by mouth.       No current facility-administered medications on file prior to visit.       Allergies, past medical history, past surgical history, family history, social history reviewed and updated    ROS:  All other systems reviewed and are negative except as stated in the HPI       Physical Exam:     /64 (BP Location: Right arm, Patient Position: Sitting, BP Cuff Size: Adult)   Pulse 99   Temp 37.2 °C (98.9 °F) (Temporal)   Ht 1.56 m (5' 1.42\")   Wt 86.4 kg (190 lb 6.4 oz)   SpO2 94%   BMI 35.49 kg/m²   General: Normal appearing. No distress  Pulmonary: Clear to ausculation.  Normal effort.   Cardiovascular: Regular rate and rhythm    Assessment and Plan:     41 y.o. female with the following issues.    1. Screening for thyroid disorder  - FREE THYROXINE; Future  - TSH; Future  - THYROID PEROXIDASE  (TPO) AB; Future  - T3 FREE; Future    2. Encounter for vitamin deficiency screening  - VITAMIN D,25 HYDROXY; Future  - VITAMIN B12; Future    3. Health care maintenance  - Comp Metabolic Panel; Future  - CRP HIGH SENSITIVE (CARDIAC); Future  - MAGNESIUM; Future    4. Diabetes mellitus screening  - HEMOGLOBIN A1C; Future  - INSULIN FASTING; Future    5. " Lipid screening  - LipoFit by NMR; Future    6. Obesity (BMI 30.0-34.9)  - Comp Metabolic Panel; Future  - CRP HIGH SENSITIVE (CARDIAC); Future  - FREE THYROXINE; Future  - TSH; Future  - THYROID PEROXIDASE  (TPO) AB; Future  - T3 FREE; Future    > Patient is also following up with gynecology for Pap smear and other gynecological wellness, reported is up-to-date with the Pap smear    Follow Up:      Return in about 4 weeks (around 12/14/2021).  Labs  Please note that this dictation was created using voice recognition software. I have made every reasonable attempt to correct obvious errors, but I expect that there are errors of grammar and possibly content that I did not discover before finalizing the note.    Signed by: Marybeth Rueda M.D.

## 2021-11-23 ENCOUNTER — HOSPITAL ENCOUNTER (OUTPATIENT)
Dept: LAB | Facility: MEDICAL CENTER | Age: 41
End: 2021-11-23
Attending: INTERNAL MEDICINE
Payer: COMMERCIAL

## 2021-11-23 DIAGNOSIS — Z13.29 SCREENING FOR THYROID DISORDER: ICD-10-CM

## 2021-11-23 DIAGNOSIS — Z13.1 DIABETES MELLITUS SCREENING: ICD-10-CM

## 2021-11-23 DIAGNOSIS — E66.9 OBESITY (BMI 30.0-34.9): ICD-10-CM

## 2021-11-23 DIAGNOSIS — Z00.00 HEALTH CARE MAINTENANCE: ICD-10-CM

## 2021-11-23 DIAGNOSIS — Z13.220 LIPID SCREENING: ICD-10-CM

## 2021-11-23 DIAGNOSIS — Z13.21 ENCOUNTER FOR VITAMIN DEFICIENCY SCREENING: ICD-10-CM

## 2021-11-23 LAB
CRP SERPL HS-MCNC: 5.1 MG/L (ref 0–3)
EST. AVERAGE GLUCOSE BLD GHB EST-MCNC: 100 MG/DL
FASTING STATUS PATIENT QL REPORTED: NORMAL
HBA1C MFR BLD: 5.1 % (ref 4–5.6)
MAGNESIUM SERPL-MCNC: 2.1 MG/DL (ref 1.5–2.5)
T4 FREE SERPL-MCNC: 0.94 NG/DL (ref 0.93–1.7)
THYROPEROXIDASE AB SERPL-ACNC: <9 IU/ML (ref 0–9)
TSH SERPL DL<=0.005 MIU/L-ACNC: 1.5 UIU/ML (ref 0.38–5.33)
VIT B12 SERPL-MCNC: 810 PG/ML (ref 211–911)

## 2021-11-23 PROCEDURE — 36415 COLL VENOUS BLD VENIPUNCTURE: CPT

## 2021-11-23 PROCEDURE — 84481 FREE ASSAY (FT-3): CPT

## 2021-11-23 PROCEDURE — 84443 ASSAY THYROID STIM HORMONE: CPT

## 2021-11-23 PROCEDURE — 83036 HEMOGLOBIN GLYCOSYLATED A1C: CPT

## 2021-11-23 PROCEDURE — 83525 ASSAY OF INSULIN: CPT

## 2021-11-23 PROCEDURE — 86141 C-REACTIVE PROTEIN HS: CPT

## 2021-11-23 PROCEDURE — 86376 MICROSOMAL ANTIBODY EACH: CPT

## 2021-11-23 PROCEDURE — 82306 VITAMIN D 25 HYDROXY: CPT

## 2021-11-23 PROCEDURE — 84439 ASSAY OF FREE THYROXINE: CPT

## 2021-11-23 PROCEDURE — 80061 LIPID PANEL: CPT

## 2021-11-23 PROCEDURE — 83704 LIPOPROTEIN BLD QUAN PART: CPT

## 2021-11-23 PROCEDURE — 83735 ASSAY OF MAGNESIUM: CPT

## 2021-11-23 PROCEDURE — 80053 COMPREHEN METABOLIC PANEL: CPT

## 2021-11-23 PROCEDURE — 82607 VITAMIN B-12: CPT

## 2021-11-24 LAB
ALBUMIN SERPL BCP-MCNC: 4.8 G/DL (ref 3.2–4.9)
ALBUMIN/GLOB SERPL: 1.8 G/DL
ALP SERPL-CCNC: 63 U/L (ref 30–99)
ALT SERPL-CCNC: 50 U/L (ref 2–50)
ANION GAP SERPL CALC-SCNC: 4 MMOL/L (ref 7–16)
AST SERPL-CCNC: 26 U/L (ref 12–45)
BILIRUB SERPL-MCNC: 0.5 MG/DL (ref 0.1–1.5)
BUN SERPL-MCNC: 11 MG/DL (ref 8–22)
CALCIUM SERPL-MCNC: 9.6 MG/DL (ref 8.5–10.5)
CHLORIDE SERPL-SCNC: 111 MMOL/L (ref 96–112)
CO2 SERPL-SCNC: 25 MMOL/L (ref 20–33)
CREAT SERPL-MCNC: 0.55 MG/DL (ref 0.5–1.4)
GLOBULIN SER CALC-MCNC: 2.7 G/DL (ref 1.9–3.5)
GLUCOSE SERPL-MCNC: 101 MG/DL (ref 65–99)
POTASSIUM SERPL-SCNC: 4.2 MMOL/L (ref 3.6–5.5)
PROT SERPL-MCNC: 7.5 G/DL (ref 6–8.2)
SODIUM SERPL-SCNC: 140 MMOL/L (ref 135–145)
T3FREE SERPL-MCNC: 3.52 PG/ML (ref 2–4.4)

## 2021-11-25 LAB — INSULIN P FAST SERPL-ACNC: 15 UIU/ML (ref 3–25)

## 2021-11-26 LAB — 25(OH)D3 SERPL-MCNC: 44 NG/ML (ref 30–80)

## 2021-11-27 LAB
CHOLEST SERPL-MCNC: 190 MG/DL
HDL PARTICAL NO Q4363: 37.6 UMOL/L
HDL SIZE Q4361: 8.7 NM
HDLC SERPL-MCNC: 54 MG/DL (ref 40–59)
HLD.LARGE SERPL-SCNC: 5.1 UMOL/L
L VLDL PART NO Q4357: 2 NMOL/L
LDL SERPL QN: 21.2 NM
LDL SERPL-SCNC: 1390 NMOL/L
LDL SMALL SERPL-SCNC: 404 NMOL/L
LDLC SERPL CALC-MCNC: 115 MG/DL
PATHOLOGY STUDY: ABNORMAL
TRIGL SERPL-MCNC: 103 MG/DL (ref 30–149)
VLDL SIZE Q4362: 44.5 NM

## 2021-12-27 ENCOUNTER — OFFICE VISIT (OUTPATIENT)
Dept: MEDICAL GROUP | Facility: PHYSICIAN GROUP | Age: 41
End: 2021-12-27
Payer: COMMERCIAL

## 2021-12-27 VITALS
WEIGHT: 195 LBS | HEART RATE: 77 BPM | OXYGEN SATURATION: 96 % | TEMPERATURE: 97.6 F | SYSTOLIC BLOOD PRESSURE: 110 MMHG | BODY MASS INDEX: 36.82 KG/M2 | DIASTOLIC BLOOD PRESSURE: 58 MMHG | HEIGHT: 61 IN

## 2021-12-27 DIAGNOSIS — Z30.431 ENCOUNTER FOR ROUTINE CHECKING OF INTRAUTERINE CONTRACEPTIVE DEVICE (IUD): ICD-10-CM

## 2021-12-27 DIAGNOSIS — E66.9 OBESITY (BMI 30.0-34.9): ICD-10-CM

## 2021-12-27 PROBLEM — Z12.4 SCREENING FOR CERVICAL CANCER: Status: RESOLVED | Noted: 2019-12-23 | Resolved: 2021-12-27

## 2021-12-27 PROCEDURE — 99214 OFFICE O/P EST MOD 30 MIN: CPT | Performed by: INTERNAL MEDICINE

## 2021-12-27 ASSESSMENT — FIBROSIS 4 INDEX: FIB4 SCORE: 0.46

## 2021-12-27 NOTE — PROGRESS NOTES
"Established Patient    Chief Complaint   Patient presents with   • Lab Results       Subjective:     HPI:   June presents today with the following.    Patient had lab recently with normal cholesterol panel, trending up triglyceride as well as LDL, reports also she gained some weight over the last 2 years, she is not physically active as before, otherwise patient denied having other symptoms    Patient Active Problem List    Diagnosis Date Noted   • Obesity (BMI 30.0-34.9) 12/23/2019       Current Outpatient Medications on File Prior to Visit   Medication Sig Dispense Refill   • Multiple Vitamins-Minerals (MULTI-VITAMIN GUMMIES PO) Take  by mouth.       No current facility-administered medications on file prior to visit.       Allergies, past medical history, past surgical history, family history, social history reviewed and updated    ROS:  All other systems reviewed and are negative except as stated in the HPI       Physical Exam:     /58 (BP Location: Right arm, Patient Position: Sitting, BP Cuff Size: Adult)   Pulse 77   Temp 36.4 °C (97.6 °F) (Temporal)   Ht 1.56 m (5' 1.42\")   Wt 88.5 kg (195 lb)   SpO2 96%   BMI 36.35 kg/m²   General: Normal appearing. No distress.  Pulmonary: Clear to ausculation.  Normal effort.   Cardiovascular: Regular rate and rhythm    Assessment and Plan:     41 y.o. female with the following issues.    1. Obesity (BMI 30.0-34.9)  -Lengthy discussion regarding healthy dietary options including plenty of vegetable, avoid sugars, regular exercise as tolerated, healthy fat/protein/carbs  - Shown pictures regarding healthy lifestyle changes and healthy dietary options  -Patient would like to start these lifestyle changes to improve obesity/overweight as well as other chronic conditions    Follow Up:      Return in about 6 weeks (around 2/7/2022).    Please note that this dictation was created using voice recognition software. I have made every reasonable attempt to correct " obvious errors, but I expect that there are errors of grammar and possibly content that I did not discover before finalizing the note.    Signed by: Marybeth Rueda M.D.

## 2021-12-27 NOTE — PATIENT INSTRUCTIONS
"Cholesterol problem treatment:  # Resveratrol ( berries, dark chocolate, unsweetened cocoa, 90% or  above)   Garlic, onion, leeks   Probiotics:  billions  Fermented foods= sauerkraut, yogurt \" plain, organic\", kefir, apple cider vinegar with mother, kimchi >> organic   Nuts and Seeds \" also sejal and Flax seeds\" >> unsalted, un-roasted, raw, organic  Fish oil (cod liver oil)  Turmeric, arias, Boswellia, black pepper, Cinnamon combination supplement   Green Tea Matcha Organic >>2-3 cups in day  "

## 2022-12-02 ENCOUNTER — OFFICE VISIT (OUTPATIENT)
Dept: URGENT CARE | Facility: PHYSICIAN GROUP | Age: 42
End: 2022-12-02
Payer: COMMERCIAL

## 2022-12-02 VITALS
HEART RATE: 67 BPM | RESPIRATION RATE: 18 BRPM | OXYGEN SATURATION: 96 % | DIASTOLIC BLOOD PRESSURE: 64 MMHG | WEIGHT: 193 LBS | BODY MASS INDEX: 35.51 KG/M2 | SYSTOLIC BLOOD PRESSURE: 122 MMHG | TEMPERATURE: 98 F | HEIGHT: 62 IN

## 2022-12-02 DIAGNOSIS — H92.01 OTALGIA, RIGHT: ICD-10-CM

## 2022-12-02 DIAGNOSIS — J02.0 STREP PHARYNGITIS: ICD-10-CM

## 2022-12-02 DIAGNOSIS — J02.9 SORE THROAT: ICD-10-CM

## 2022-12-02 LAB
INT CON NEG: ABNORMAL
INT CON POS: ABNORMAL
S PYO AG THROAT QL: POSITIVE

## 2022-12-02 PROCEDURE — 99213 OFFICE O/P EST LOW 20 MIN: CPT

## 2022-12-02 PROCEDURE — 87880 STREP A ASSAY W/OPTIC: CPT

## 2022-12-02 RX ORDER — AMOXICILLIN 500 MG/1
500 CAPSULE ORAL 2 TIMES DAILY
Qty: 20 CAPSULE | Refills: 0 | Status: SHIPPED | OUTPATIENT
Start: 2022-12-02 | End: 2022-12-12

## 2022-12-02 ASSESSMENT — ENCOUNTER SYMPTOMS
FEVER: 0
COUGH: 1
VOMITING: 0
CHILLS: 0
DIARRHEA: 0
SORE THROAT: 1

## 2022-12-02 NOTE — PROGRESS NOTES
"Subjective     June Ortiz is a 42 y.o. female who presents with Earache (X2 days, right ear ), Sore Throat (X1 week ), and Cough (Slight cough )            HPI    Patient presents with symptoms for repair.  She reports sore throat when her symptoms started, which she describes as painful swallowing, feels like \"her throat is burning\".  She further reports rhinorrhea, nasal congestion, and intermittent cough.  She has been taking Tylenol, TheraFlu, and NyQuil, reports significant relief.  2 days ago, she noted right ear pain.  She is taking Tylenol as needed for this.  She denies any ear discharge.  Patient denies any fever, chills, vomiting, or diarrhea.  She reports that she is COVID vaccinated.  She reports  was sick with similar symptoms a week ago, but tested negative for COVID-19.    Patient's current problem list, medications, and past medical/surgical history were reviewed in Epic.    PMH:  has a past medical history of Allergy.    She has no past medical history of Addisons disease ([facility]), Adrenal disorder (HCC), Anemia, Anxiety, Arrhythmia, Arthritis, Asthma, Blood transfusion without reported diagnosis, Cancer ([facility]), Cataract, CHF (congestive heart failure) ([facility]), Clotting disorder (HCC), COPD (chronic obstructive pulmonary disease) ([facility]), Cushings syndrome (HCC), Depression, Diabetes (HCC), Diabetic neuropathy (HCC), Encounter for long-term (current) use of other medications, GERD (gastroesophageal reflux disease), Glaucoma, Goiter, Head ache, Heart attack (HCC), Heart murmur, HIV (human immunodeficiency virus infection) ([facility]), Hyperlipidemia, Hypertension, IBD (inflammatory bowel disease), Kidney disease, Meningitis, Migraine, Muscle disorder, Osteoporosis, Parathyroid disorder (HCC), Pituitary disease (HCC), Pulmonary emphysema (HCC), Seizure (HCC), Sickle cell disease (HCC), Stroke (HCC), Substance abuse (HCC), Thyroid disease, Tuberculosis, or Urinary tract infection.  MEDS: " "  Current Outpatient Medications:     Multiple Vitamins-Minerals (MULTI-VITAMIN GUMMIES PO), Take  by mouth., Disp: , Rfl:   ALLERGIES:   Allergies   Allergen Reactions    Morphine Vomiting     SURGHX:   Past Surgical History:   Procedure Laterality Date    PRIMARY C SECTION       SOCHX:  reports that she has never smoked. She has never used smokeless tobacco. She reports that she does not currently use alcohol. She reports that she does not currently use drugs.  FH: Reviewed with patient, not pertinent to this visit.       Review of Systems   Constitutional:  Negative for chills and fever.   HENT:  Positive for congestion, ear pain and sore throat. Negative for ear discharge.    Respiratory:  Positive for cough (mild).    Gastrointestinal:  Negative for diarrhea and vomiting.            Objective     /64   Pulse 67   Temp 36.7 °C (98 °F) (Temporal)   Resp 18   Ht 1.575 m (5' 2\")   Wt 87.5 kg (193 lb)   SpO2 96%   BMI 35.30 kg/m²      Physical Exam  Constitutional:       Appearance: Normal appearance.   HENT:      Head: Normocephalic.      Nose: Congestion present.   Eyes:      Extraocular Movements: Extraocular movements intact.   Cardiovascular:      Rate and Rhythm: Normal rate and regular rhythm.      Pulses: Normal pulses.      Heart sounds: Normal heart sounds.   Pulmonary:      Effort: Pulmonary effort is normal.      Breath sounds: Normal breath sounds.   Musculoskeletal:         General: Normal range of motion.      Cervical back: Normal range of motion.   Skin:     General: Skin is warm and dry.   Neurological:      General: No focal deficit present.      Mental Status: She is alert.   Psychiatric:         Mood and Affect: Mood normal.         Behavior: Behavior normal.         Judgment: Judgment normal.       Rapid strep a- positive        Assessment & Plan     1. Strep pharyngitis    - amoxicillin (AMOXIL) 500 MG Cap; Take 1 Capsule by mouth 2 times a day for 10 days.  Dispense: 20 Capsule; " Refill: 0    2. Sore throat    - POCT Rapid Strep A    3. Otalgia, right      Patient tested positive for strep A.  Her presentation is consistent with strep pharyngitis.  She is prescribed amoxicillin twice daily for 10 days.  Advised to complete antibiotics for 10 days, even if she is feeling better.  May take ibuprofen or Tylenol as needed for sore throat and ear pain.  Instructed to use OTC antihistamine and decongestant Flonase nasal spray for ear fullness/ear pain.  Discussed treatment plan with patient, she is agreeable and verbalized understanding.  Educated patient on signs and symptoms watch out for, when to return to the clinic or go to the ER.    Recommended supportive treatment at home:  OTC Tylenol or Motrin for fever/discomfort.  OTC supportive care for nasal congestion - saline nasal spray/Flonase nasal spray and/or netipot  Humidifier and steam inhalation/warm showers.  Increase oral fluid intake.    Electronically Signed by DANISHA Brooks

## 2022-12-12 ENCOUNTER — TELEPHONE (OUTPATIENT)
Dept: HEALTH INFORMATION MANAGEMENT | Facility: OTHER | Age: 42
End: 2022-12-12
Payer: COMMERCIAL

## 2022-12-12 NOTE — TELEPHONE ENCOUNTER
Spoke this AM with patient per Dr Rueda's request. Patient states she is feeling better and has been afebrile since Saturday. Throat is less sore as well. Recommended salt water gargle. Patient states her lymph nodes in her neck are swollen. Discussed normal physiologic process of lymph nodes filtering infection. Instructed patient to see Dr Rueda in 1 week- 2 weeks if lymph nodes remain swollen after infection is completely resolved

## 2022-12-19 ENCOUNTER — OFFICE VISIT (OUTPATIENT)
Dept: MEDICAL GROUP | Facility: PHYSICIAN GROUP | Age: 42
End: 2022-12-19
Payer: COMMERCIAL

## 2022-12-19 VITALS
TEMPERATURE: 98.6 F | OXYGEN SATURATION: 95 % | BODY MASS INDEX: 35.92 KG/M2 | SYSTOLIC BLOOD PRESSURE: 118 MMHG | HEART RATE: 104 BPM | DIASTOLIC BLOOD PRESSURE: 66 MMHG | HEIGHT: 62 IN | WEIGHT: 195.2 LBS

## 2022-12-19 DIAGNOSIS — J02.9 SORE THROAT: ICD-10-CM

## 2022-12-19 DIAGNOSIS — R53.83 OTHER FATIGUE: ICD-10-CM

## 2022-12-19 DIAGNOSIS — R05.1 ACUTE COUGH: ICD-10-CM

## 2022-12-19 DIAGNOSIS — J02.9 PHARYNGITIS, UNSPECIFIED ETIOLOGY: ICD-10-CM

## 2022-12-19 DIAGNOSIS — R59.9 SWOLLEN LYMPH NODES: ICD-10-CM

## 2022-12-19 LAB
FLUAV+FLUBV AG SPEC QL IA: NEGATIVE
HETEROPH AB SER QL LA: NEGATIVE
INT CON NEG: NORMAL
INT CON POS: NORMAL
S PYO AG THROAT QL: NEGATIVE

## 2022-12-19 PROCEDURE — 86308 HETEROPHILE ANTIBODY SCREEN: CPT

## 2022-12-19 PROCEDURE — 99213 OFFICE O/P EST LOW 20 MIN: CPT

## 2022-12-19 PROCEDURE — 87804 INFLUENZA ASSAY W/OPTIC: CPT

## 2022-12-19 PROCEDURE — 87880 STREP A ASSAY W/OPTIC: CPT

## 2022-12-19 RX ORDER — LIDOCAINE HYDROCHLORIDE 20 MG/ML
5-15 SOLUTION OROPHARYNGEAL
Qty: 120 ML | Refills: 0 | Status: SHIPPED | OUTPATIENT
Start: 2022-12-19 | End: 2022-12-29

## 2022-12-19 RX ORDER — DEXTROMETHORPHAN HYDROBROMIDE AND PROMETHAZINE HYDROCHLORIDE 15; 6.25 MG/5ML; MG/5ML
5 SYRUP ORAL EVERY 4 HOURS PRN
Qty: 120 ML | Refills: 0 | Status: SHIPPED | OUTPATIENT
Start: 2022-12-19 | End: 2022-12-29

## 2022-12-19 NOTE — PROGRESS NOTES
"Subjective:     CC:   Chief Complaint   Patient presents with    Pharyngitis     X3 days    Nasal Congestion     X2 days    Fever     Last night       HISTORY OF THE PRESENT ILLNESS: June is a pleasant 42 y.o. female here today for recurrent upper respiratory symptoms including sore throat for the last 3 days, nasal congestion and low-grade fever around 99 degrees last night.    Patient did test positive for strep approximately 2.5 weeks ago.  She was treated with amoxicillin and felt better.  She states her son tested positive for strep last week.  Her symptoms have now returned and she is here to get tested and make sure nothing else is causing her symptoms.    She denies ear pain ear fullness, sinus pain, sinus pressure, cough, shortness of breath or difficulty breathing.    Health Maintenance: Completed    ROS:  All systems negative expect as addressed in assessment and plan.     Objective:     Exam:  /66 (BP Location: Right arm, Patient Position: Sitting, BP Cuff Size: Adult)   Pulse (!) 104   Temp 37 °C (98.6 °F) (Temporal)   Ht 1.575 m (5' 2\")   Wt 88.5 kg (195 lb 3.2 oz)   SpO2 95%   BMI 35.70 kg/m²  Body mass index is 35.7 kg/m².    Physical Exam  HENT:      Right Ear: Tympanic membrane normal.      Left Ear: Tympanic membrane normal.   Cardiovascular:      Rate and Rhythm: Normal rate.   Pulmonary:      Effort: Pulmonary effort is normal.      Breath sounds: Normal breath sounds.       Labs: Results reviewed from 11/23/21    Assessment & Plan:     42 y.o. female with the following -    1. Acute cough  2. Sore throat  3. Other fatigue  4. Swollen lymph nodes  - POCT Mononucleosis (mono) -NEGATIVE  - POCT Influenza A/B - NEGATIVE  - POCT Rapid Strep A - NEGATIVE    5. Pharyngitis, unspecified etiology  PLAN:  1. Educated patient that majority of upper respiratory infections are viral and do not need antibiotics.   2. Twice daily use of nasal saline rinse or Neti-Pot.  3. OTC anti-pyretics and " decongestants as needed.  4. Follow-up in office or urgent care for worsening symptoms, difficulty breathing, lack of expected recovery, or should new symptoms or problems arise.    Patient was educated in proper administration of medication(s) ordered today including safety, possible SE, risks, benefits, rationale and alternatives to therapy.   Supportive care, differential diagnoses, and indications for immediate follow-up discussed with patient.    Pathogenesis of diagnosis discussed including typical length and natural progression.    Instructed to return to clinic or nearest emergency department for any change in condition, further concerns, or worsening of symptoms.  Patient states understanding of the plan of care and discharge instructions.    Return if symptoms worsen or fail to improve.    I spent a total of 25 minutes with record review, exam, and communication with the patient, communication with other providers, and documentation of this encounter. This does not include time spent on separately billable procedures/tests.    I have placed the above orders and discussed them with an approved delegating provider.  The MA is performing the below orders under the direction of Dr. Ramírez.    Please note that this dictation was created using voice recognition software. I have worked with consultants from the vendor as well as technical experts from [facility] [email or web address] to optimize the interface. I have made every reasonable attempt to correct obvious errors, but I expect that there are errors of grammar and possibly content that I did not discover before finalizing the note.

## 2022-12-22 ENCOUNTER — PATIENT MESSAGE (OUTPATIENT)
Dept: MEDICAL GROUP | Facility: PHYSICIAN GROUP | Age: 42
End: 2022-12-22
Payer: COMMERCIAL

## 2022-12-22 NOTE — PATIENT COMMUNICATION
Overall feeling a bit better but still coughing up green colored mucous and having green nasal drainage. No fever since 12/19/22. Now having yellow discharge from right eye. Denies shortness of breath.     Confirmed that she did complete 10 day course of antibiotics and repeat strep test was negative. Has not been tested for COVID, thought a home test was negative a few weeks ago.     Patient started decongestant today and says today is the best day she has had so far, though not feeling great. She has not tried saline nasal rinse. She was advised to use the saline rinse and decongestant as suggested by provider at urgent care. Appointment with PCP made for one week from now if she is not improving, she will cancel if feeling better. She will go to urgent care over the weekend if symptoms worsen and if fever returns.

## 2022-12-29 ENCOUNTER — OFFICE VISIT (OUTPATIENT)
Dept: MEDICAL GROUP | Facility: PHYSICIAN GROUP | Age: 42
End: 2022-12-29
Payer: COMMERCIAL

## 2022-12-29 VITALS
HEIGHT: 62 IN | HEART RATE: 101 BPM | RESPIRATION RATE: 16 BRPM | WEIGHT: 194 LBS | OXYGEN SATURATION: 96 % | TEMPERATURE: 98.3 F | DIASTOLIC BLOOD PRESSURE: 76 MMHG | SYSTOLIC BLOOD PRESSURE: 118 MMHG | BODY MASS INDEX: 35.7 KG/M2

## 2022-12-29 DIAGNOSIS — R09.89 UPPER RESPIRATORY SYMPTOM: ICD-10-CM

## 2022-12-29 DIAGNOSIS — Z13.6 ENCOUNTER FOR LIPID SCREENING FOR CARDIOVASCULAR DISEASE: ICD-10-CM

## 2022-12-29 DIAGNOSIS — R73.01 IMPAIRED FASTING GLUCOSE: ICD-10-CM

## 2022-12-29 DIAGNOSIS — I49.9 IRREGULAR HEART BEATS: ICD-10-CM

## 2022-12-29 DIAGNOSIS — E55.9 VITAMIN D DEFICIENCY: ICD-10-CM

## 2022-12-29 DIAGNOSIS — Z13.220 ENCOUNTER FOR LIPID SCREENING FOR CARDIOVASCULAR DISEASE: ICD-10-CM

## 2022-12-29 DIAGNOSIS — Z13.29 SCREENING FOR THYROID DISORDER: ICD-10-CM

## 2022-12-29 DIAGNOSIS — E66.9 OBESITY (BMI 30.0-34.9): ICD-10-CM

## 2022-12-29 LAB — EKG 4674: NORMAL

## 2022-12-29 PROCEDURE — 93000 ELECTROCARDIOGRAM COMPLETE: CPT | Performed by: INTERNAL MEDICINE

## 2022-12-29 PROCEDURE — 99214 OFFICE O/P EST MOD 30 MIN: CPT | Performed by: INTERNAL MEDICINE

## 2022-12-29 ASSESSMENT — PATIENT HEALTH QUESTIONNAIRE - PHQ9: CLINICAL INTERPRETATION OF PHQ2 SCORE: 0

## 2022-12-29 NOTE — PROGRESS NOTES
"Established Patient    Chief Complaint   Patient presents with    URI       Subjective:     HPI:   June presents today with the following.    Patient Active Problem List    Diagnosis Date Noted    Vitamin D deficiency 12/29/2022    Obesity (BMI 30.0-34.9) 12/23/2019       Current Outpatient Medications on File Prior to Visit   Medication Sig Dispense Refill    Multiple Vitamins-Minerals (MULTI-VITAMIN GUMMIES PO) Take  by mouth.       No current facility-administered medications on file prior to visit.       Allergies, past medical history, past surgical history, family history, social history reviewed and updated    ROS:  All other systems reviewed and are negative except as stated in the HPI       Physical Exam:     /76 (BP Location: Right arm, Patient Position: Sitting, BP Cuff Size: Adult)   Pulse (!) 101   Temp 36.8 °C (98.3 °F) (Temporal)   Resp 16   Ht 1.575 m (5' 2\")   Wt 88 kg (194 lb)   SpO2 96%   BMI 35.48 kg/m²   General: Normal appearing. No distress.  Pulmonary: Clear to ausculation.  Normal effort.   Cardiovascular: Regular rate and rhythm    Assessment and Plan:     42 y.o. female with the following issues.    1. Upper respiratory symptom  This is going on since Thanksgiving, she had a strep pharyngitis, treated with amoxicillin, after that was seen by another provider, prescribed some anticough medicine, patient is feeling better, she still have some congestion of the nose and both ears, suggestive for allergies, educated in detail regarding conservative management as well, patient denied having COVID or influenza symptoms  - Comp Metabolic Panel; Future    2. Obesity (BMI 30.0-34.9)  - Comp Metabolic Panel; Future    3. Vitamin D deficiency  - VITAMIN D,25 HYDROXY (DEFICIENCY); Future    4. Screening for thyroid disorder  - FREE THYROXINE; Future  - T3 FREE; Future  - TSH; Future    5. Impaired fasting glucose  - Comp Metabolic Panel; Future  - HEMOGLOBIN A1C; Future    6. Encounter " for lipid screening for cardiovascular disease  - Lipid Profile; Future    7. Irregular heart beats  Reported sometime fluttering of the heart, denied having chest pain, shortness of breath, dizziness, syncope, other symptoms  > POCT EKG:  NSR, normal axis and intervals, no acute or chronic ischemic changes. Nonspecific ST changes     - Comp Metabolic Panel; Future  - FREE THYROXINE; Future  - T3 FREE; Future  - TSH; Future    Follow Up:      Return in about 4 weeks (around 1/26/2023) for labs.    Please note that this dictation was created using voice recognition software. I have made every reasonable attempt to correct obvious errors, but I expect that there are errors of grammar and possibly content that I did not discover before finalizing the note.    Signed by: Marybeth Rueda M.D.

## 2023-01-10 ENCOUNTER — APPOINTMENT (OUTPATIENT)
Dept: OBGYN | Facility: CLINIC | Age: 43
End: 2023-01-10
Payer: COMMERCIAL

## 2023-02-06 ENCOUNTER — APPOINTMENT (OUTPATIENT)
Dept: OBGYN | Facility: CLINIC | Age: 43
End: 2023-02-06
Payer: COMMERCIAL

## 2023-05-25 ENCOUNTER — HOSPITAL ENCOUNTER (OUTPATIENT)
Dept: LAB | Facility: MEDICAL CENTER | Age: 43
End: 2023-05-25
Attending: INTERNAL MEDICINE
Payer: COMMERCIAL

## 2023-05-25 DIAGNOSIS — Z13.220 ENCOUNTER FOR LIPID SCREENING FOR CARDIOVASCULAR DISEASE: ICD-10-CM

## 2023-05-25 DIAGNOSIS — Z13.29 SCREENING FOR THYROID DISORDER: ICD-10-CM

## 2023-05-25 DIAGNOSIS — E55.9 VITAMIN D DEFICIENCY: ICD-10-CM

## 2023-05-25 DIAGNOSIS — R73.01 IMPAIRED FASTING GLUCOSE: ICD-10-CM

## 2023-05-25 DIAGNOSIS — R09.89 UPPER RESPIRATORY SYMPTOM: ICD-10-CM

## 2023-05-25 DIAGNOSIS — I49.9 IRREGULAR HEART BEATS: ICD-10-CM

## 2023-05-25 DIAGNOSIS — E66.9 OBESITY (BMI 30.0-34.9): ICD-10-CM

## 2023-05-25 DIAGNOSIS — Z13.6 ENCOUNTER FOR LIPID SCREENING FOR CARDIOVASCULAR DISEASE: ICD-10-CM

## 2023-05-25 LAB
25(OH)D3 SERPL-MCNC: 43 NG/ML (ref 30–100)
ALBUMIN SERPL BCP-MCNC: 4.1 G/DL (ref 3.2–4.9)
ALBUMIN/GLOB SERPL: 1.5 G/DL
ALP SERPL-CCNC: 59 U/L (ref 30–99)
ALT SERPL-CCNC: 38 U/L (ref 2–50)
ANION GAP SERPL CALC-SCNC: 11 MMOL/L (ref 7–16)
AST SERPL-CCNC: 31 U/L (ref 12–45)
BILIRUB SERPL-MCNC: 0.3 MG/DL (ref 0.1–1.5)
BUN SERPL-MCNC: 13 MG/DL (ref 8–22)
CALCIUM ALBUM COR SERPL-MCNC: 9.3 MG/DL (ref 8.5–10.5)
CALCIUM SERPL-MCNC: 9.4 MG/DL (ref 8.5–10.5)
CHLORIDE SERPL-SCNC: 107 MMOL/L (ref 96–112)
CHOLEST SERPL-MCNC: 169 MG/DL (ref 100–199)
CO2 SERPL-SCNC: 23 MMOL/L (ref 20–33)
CREAT SERPL-MCNC: 0.69 MG/DL (ref 0.5–1.4)
EST. AVERAGE GLUCOSE BLD GHB EST-MCNC: 114 MG/DL
FASTING STATUS PATIENT QL REPORTED: NORMAL
GFR SERPLBLD CREATININE-BSD FMLA CKD-EPI: 112 ML/MIN/1.73 M 2
GLOBULIN SER CALC-MCNC: 2.8 G/DL (ref 1.9–3.5)
GLUCOSE SERPL-MCNC: 97 MG/DL (ref 65–99)
HBA1C MFR BLD: 5.6 % (ref 4–5.6)
HDLC SERPL-MCNC: 47 MG/DL
LDLC SERPL CALC-MCNC: 101 MG/DL
POTASSIUM SERPL-SCNC: 4.3 MMOL/L (ref 3.6–5.5)
PROT SERPL-MCNC: 6.9 G/DL (ref 6–8.2)
SODIUM SERPL-SCNC: 141 MMOL/L (ref 135–145)
T3FREE SERPL-MCNC: 3.22 PG/ML (ref 2–4.4)
T4 FREE SERPL-MCNC: 0.96 NG/DL (ref 0.93–1.7)
TRIGL SERPL-MCNC: 103 MG/DL (ref 0–149)
TSH SERPL DL<=0.005 MIU/L-ACNC: 1.3 UIU/ML (ref 0.38–5.33)

## 2023-05-25 PROCEDURE — 82306 VITAMIN D 25 HYDROXY: CPT

## 2023-05-25 PROCEDURE — 84439 ASSAY OF FREE THYROXINE: CPT

## 2023-05-25 PROCEDURE — 80061 LIPID PANEL: CPT

## 2023-05-25 PROCEDURE — 36415 COLL VENOUS BLD VENIPUNCTURE: CPT

## 2023-05-25 PROCEDURE — 84443 ASSAY THYROID STIM HORMONE: CPT

## 2023-05-25 PROCEDURE — 83036 HEMOGLOBIN GLYCOSYLATED A1C: CPT

## 2023-05-25 PROCEDURE — 84481 FREE ASSAY (FT-3): CPT

## 2023-05-25 PROCEDURE — 80053 COMPREHEN METABOLIC PANEL: CPT

## 2023-06-05 ENCOUNTER — OFFICE VISIT (OUTPATIENT)
Dept: MEDICAL GROUP | Facility: PHYSICIAN GROUP | Age: 43
End: 2023-06-05
Payer: COMMERCIAL

## 2023-06-05 VITALS
SYSTOLIC BLOOD PRESSURE: 112 MMHG | RESPIRATION RATE: 16 BRPM | BODY MASS INDEX: 35.35 KG/M2 | TEMPERATURE: 98.8 F | WEIGHT: 192.1 LBS | HEIGHT: 62 IN | OXYGEN SATURATION: 95 % | DIASTOLIC BLOOD PRESSURE: 90 MMHG | HEART RATE: 79 BPM

## 2023-06-05 DIAGNOSIS — R53.83 OTHER FATIGUE: ICD-10-CM

## 2023-06-05 DIAGNOSIS — Z91.09 ENVIRONMENTAL ALLERGIES: ICD-10-CM

## 2023-06-05 DIAGNOSIS — Z83.49 FAMILY HISTORY OF THYROID DISEASE IN MOTHER: ICD-10-CM

## 2023-06-05 DIAGNOSIS — Z83.49 FAMILY HISTORY OF THYROID DISEASE IN SISTER: ICD-10-CM

## 2023-06-05 DIAGNOSIS — E03.8 SUBCLINICAL HYPOTHYROIDISM: ICD-10-CM

## 2023-06-05 PROCEDURE — 3074F SYST BP LT 130 MM HG: CPT | Performed by: NURSE PRACTITIONER

## 2023-06-05 PROCEDURE — 3080F DIAST BP >= 90 MM HG: CPT | Performed by: NURSE PRACTITIONER

## 2023-06-05 PROCEDURE — 99214 OFFICE O/P EST MOD 30 MIN: CPT | Performed by: NURSE PRACTITIONER

## 2023-06-05 RX ORDER — LEVOTHYROXINE SODIUM 0.03 MG/1
25 TABLET ORAL
Qty: 30 TABLET | Refills: 2 | Status: SHIPPED | OUTPATIENT
Start: 2023-06-05 | End: 2023-09-14

## 2023-06-05 RX ORDER — LORATADINE 10 MG/1
10 TABLET ORAL DAILY
COMMUNITY

## 2023-06-05 SDOH — HEALTH STABILITY: MENTAL HEALTH
STRESS IS WHEN SOMEONE FEELS TENSE, NERVOUS, ANXIOUS, OR CAN'T SLEEP AT NIGHT BECAUSE THEIR MIND IS TROUBLED. HOW STRESSED ARE YOU?: NOT AT ALL

## 2023-06-05 SDOH — ECONOMIC STABILITY: HOUSING INSECURITY
IN THE LAST 12 MONTHS, WAS THERE A TIME WHEN YOU DID NOT HAVE A STEADY PLACE TO SLEEP OR SLEPT IN A SHELTER (INCLUDING NOW)?: NO

## 2023-06-05 SDOH — ECONOMIC STABILITY: TRANSPORTATION INSECURITY
IN THE PAST 12 MONTHS, HAS LACK OF RELIABLE TRANSPORTATION KEPT YOU FROM MEDICAL APPOINTMENTS, MEETINGS, WORK OR FROM GETTING THINGS NEEDED FOR DAILY LIVING?: NO

## 2023-06-05 SDOH — ECONOMIC STABILITY: FOOD INSECURITY: WITHIN THE PAST 12 MONTHS, YOU WORRIED THAT YOUR FOOD WOULD RUN OUT BEFORE YOU GOT MONEY TO BUY MORE.: PATIENT DECLINED

## 2023-06-05 SDOH — ECONOMIC STABILITY: TRANSPORTATION INSECURITY
IN THE PAST 12 MONTHS, HAS THE LACK OF TRANSPORTATION KEPT YOU FROM MEDICAL APPOINTMENTS OR FROM GETTING MEDICATIONS?: NO

## 2023-06-05 SDOH — ECONOMIC STABILITY: HOUSING INSECURITY
IN THE LAST 12 MONTHS, WAS THERE A TIME WHEN YOU DID NOT HAVE A STEADY PLACE TO SLEEP OR SLEPT IN A SHELTER (INCLUDING NOW)?: PATIENT REFUSED

## 2023-06-05 SDOH — HEALTH STABILITY: PHYSICAL HEALTH: ON AVERAGE, HOW MANY DAYS PER WEEK DO YOU ENGAGE IN MODERATE TO STRENUOUS EXERCISE (LIKE A BRISK WALK)?: 4 DAYS

## 2023-06-05 SDOH — ECONOMIC STABILITY: FOOD INSECURITY: WITHIN THE PAST 12 MONTHS, THE FOOD YOU BOUGHT JUST DIDN'T LAST AND YOU DIDN'T HAVE MONEY TO GET MORE.: PATIENT DECLINED

## 2023-06-05 SDOH — ECONOMIC STABILITY: HOUSING INSECURITY: IN THE LAST 12 MONTHS, HOW MANY PLACES HAVE YOU LIVED?: 1

## 2023-06-05 SDOH — ECONOMIC STABILITY: INCOME INSECURITY: IN THE LAST 12 MONTHS, WAS THERE A TIME WHEN YOU WERE NOT ABLE TO PAY THE MORTGAGE OR RENT ON TIME?: PATIENT REFUSED

## 2023-06-05 SDOH — ECONOMIC STABILITY: TRANSPORTATION INSECURITY
IN THE PAST 12 MONTHS, HAS LACK OF TRANSPORTATION KEPT YOU FROM MEETINGS, WORK, OR FROM GETTING THINGS NEEDED FOR DAILY LIVING?: NO

## 2023-06-05 SDOH — ECONOMIC STABILITY: INCOME INSECURITY: HOW HARD IS IT FOR YOU TO PAY FOR THE VERY BASICS LIKE FOOD, HOUSING, MEDICAL CARE, AND HEATING?: PATIENT DECLINED

## 2023-06-05 SDOH — HEALTH STABILITY: PHYSICAL HEALTH: ON AVERAGE, HOW MANY MINUTES DO YOU ENGAGE IN EXERCISE AT THIS LEVEL?: 30 MIN

## 2023-06-05 ASSESSMENT — SOCIAL DETERMINANTS OF HEALTH (SDOH)
DO YOU BELONG TO ANY CLUBS OR ORGANIZATIONS SUCH AS CHURCH GROUPS UNIONS, FRATERNAL OR ATHLETIC GROUPS, OR SCHOOL GROUPS?: NO
IN A TYPICAL WEEK, HOW MANY TIMES DO YOU TALK ON THE PHONE WITH FAMILY, FRIENDS, OR NEIGHBORS?: MORE THAN THREE TIMES A WEEK
HOW OFTEN DO YOU ATTEND CHURCH OR RELIGIOUS SERVICES?: NEVER
HOW OFTEN DO YOU ATTENT MEETINGS OF THE CLUB OR ORGANIZATION YOU BELONG TO?: NEVER
HOW HARD IS IT FOR YOU TO PAY FOR THE VERY BASICS LIKE FOOD, HOUSING, MEDICAL CARE, AND HEATING?: PATIENT DECLINED
HOW OFTEN DO YOU ATTENT MEETINGS OF THE CLUB OR ORGANIZATION YOU BELONG TO?: NEVER
HOW OFTEN DO YOU GET TOGETHER WITH FRIENDS OR RELATIVES?: ONCE A WEEK
HOW MANY DRINKS CONTAINING ALCOHOL DO YOU HAVE ON A TYPICAL DAY WHEN YOU ARE DRINKING: 1 OR 2
HOW OFTEN DO YOU ATTEND CHURCH OR RELIGIOUS SERVICES?: NEVER
HOW OFTEN DO YOU GET TOGETHER WITH FRIENDS OR RELATIVES?: ONCE A WEEK
WITHIN THE PAST 12 MONTHS, YOU WORRIED THAT YOUR FOOD WOULD RUN OUT BEFORE YOU GOT THE MONEY TO BUY MORE: PATIENT DECLINED
IN A TYPICAL WEEK, HOW MANY TIMES DO YOU TALK ON THE PHONE WITH FAMILY, FRIENDS, OR NEIGHBORS?: MORE THAN THREE TIMES A WEEK
HOW OFTEN DO YOU HAVE SIX OR MORE DRINKS ON ONE OCCASION: NEVER
HOW OFTEN DO YOU HAVE A DRINK CONTAINING ALCOHOL: MONTHLY OR LESS
DO YOU BELONG TO ANY CLUBS OR ORGANIZATIONS SUCH AS CHURCH GROUPS UNIONS, FRATERNAL OR ATHLETIC GROUPS, OR SCHOOL GROUPS?: NO

## 2023-06-05 ASSESSMENT — LIFESTYLE VARIABLES
SKIP TO QUESTIONS 9-10: 1
HOW OFTEN DO YOU HAVE A DRINK CONTAINING ALCOHOL: MONTHLY OR LESS
HOW MANY STANDARD DRINKS CONTAINING ALCOHOL DO YOU HAVE ON A TYPICAL DAY: 1 OR 2
HOW OFTEN DO YOU HAVE SIX OR MORE DRINKS ON ONE OCCASION: NEVER
AUDIT-C TOTAL SCORE: 1

## 2023-06-05 ASSESSMENT — PATIENT HEALTH QUESTIONNAIRE - PHQ9: CLINICAL INTERPRETATION OF PHQ2 SCORE: 0

## 2023-06-05 NOTE — PROGRESS NOTES
"  Chief Complaint   Patient presents with    Establish Care     New to you                                                                                                                                        HPI:   June presents today with the following.    Problem   Subclinical Hypothyroidism   Environmental Allergies       Current Outpatient Medications   Medication Sig Dispense Refill    loratadine (CLARITIN) 10 MG Tab Take 10 mg by mouth every day.      MAGNESIUM OXIDE -MG SUPPLEMENT PO Take  by mouth.      levothyroxine (SYNTHROID) 25 MCG Tab Take 1 Tablet by mouth every morning on an empty stomach. 30 Tablet 2    Multiple Vitamins-Minerals (MULTI-VITAMIN GUMMIES PO) Take  by mouth.       No current facility-administered medications for this visit.       Allergies as of 06/05/2023 - Reviewed 06/05/2023   Allergen Reaction Noted    Morphine Vomiting 12/23/2019        ROS:  All systems negative expect as addressed in assessment and plan.     BP (!) 112/90 (BP Location: Left arm, Patient Position: Sitting, BP Cuff Size: Adult)   Pulse 79   Temp 37.1 °C (98.8 °F) (Temporal)   Resp 16   Ht 1.575 m (5' 2\")   Wt 87.1 kg (192 lb 1.6 oz)   SpO2 95%   BMI 35.14 kg/m²       Physical Exam  Vitals reviewed.   Constitutional:       Appearance: Normal appearance.   HENT:      Head: Normocephalic and atraumatic.      Mouth/Throat:      Mouth: Mucous membranes are moist.   Eyes:      Extraocular Movements: Extraocular movements intact.      Conjunctiva/sclera: Conjunctivae normal.   Pulmonary:      Effort: Pulmonary effort is normal.   Musculoskeletal:         General: Normal range of motion.      Cervical back: Normal range of motion.   Skin:     General: Skin is warm and dry.   Neurological:      General: No focal deficit present.      Mental Status: She is alert and oriented to person, place, and time.   Psychiatric:         Mood and Affect: Mood normal.         Behavior: Behavior normal.         Thought Content: " Thought content normal.           Assessment and Plan:  42 y.o. female with the following issues.    1. Environmental allergies        2. Family history of thyroid disease in sister        3. Family history of thyroid disease in mother  TSH    FREE THYROXINE    TRIIDOTHYRONINE    THYROID PEROXIDASE  (TPO) AB      4. Other fatigue  TSH    FREE THYROXINE    TRIIDOTHYRONINE    THYROID PEROXIDASE  (TPO) AB      5. Subclinical hypothyroidism             Environmental allergies  Chronic condition. Patient is taking daily allergy medication. Despite this she has had worsening sinus congestion.     Patient to continue allergy medication. Will consider kenalog injection if allergy sym[ptoms worsen or do not improve.     Subclinical hypothyroidism  Chronic condition. Patient reports symptoms of under active thyroid. She has a family history as well.     Will repeat thyroid labs. Will start levothyroxine 25mcg daily to see if patient has improvement in her symptoms.       Return in about 1 month (around 7/5/2023) for follow up for thyroid labs.      Please note that this dictation was created using voice recognition software. I have worked with consultants from the vendor as well as technical experts from [facility] [employer]" to optimize the interface. I have made every reasonable attempt to correct obvious errors, but I expect that there are errors of grammar and possibly content that I did not discover before finalizing the note.

## 2023-06-05 NOTE — ASSESSMENT & PLAN NOTE
Chronic condition. Patient is taking daily allergy medication. Despite this she has had worsening sinus congestion.     Patient to continue allergy medication. Will consider kenalog injection if allergy sym[ptoms worsen or do not improve.

## 2023-06-14 ENCOUNTER — GYNECOLOGY VISIT (OUTPATIENT)
Dept: OBGYN | Facility: CLINIC | Age: 43
End: 2023-06-14
Payer: COMMERCIAL

## 2023-06-14 ENCOUNTER — HOSPITAL ENCOUNTER (OUTPATIENT)
Facility: MEDICAL CENTER | Age: 43
End: 2023-06-14
Attending: STUDENT IN AN ORGANIZED HEALTH CARE EDUCATION/TRAINING PROGRAM
Payer: COMMERCIAL

## 2023-06-14 VITALS — SYSTOLIC BLOOD PRESSURE: 118 MMHG | BODY MASS INDEX: 35.41 KG/M2 | WEIGHT: 193.6 LBS | DIASTOLIC BLOOD PRESSURE: 73 MMHG

## 2023-06-14 DIAGNOSIS — Z12.4 SCREENING FOR MALIGNANT NEOPLASM OF CERVIX: ICD-10-CM

## 2023-06-14 DIAGNOSIS — Z12.39 ENCOUNTER FOR SCREENING BREAST EXAMINATION: ICD-10-CM

## 2023-06-14 DIAGNOSIS — Z01.419 ENCOUNTER FOR GYNECOLOGICAL EXAMINATION (GENERAL) (ROUTINE) WITHOUT ABNORMAL FINDINGS: ICD-10-CM

## 2023-06-14 PROCEDURE — 3074F SYST BP LT 130 MM HG: CPT | Performed by: STUDENT IN AN ORGANIZED HEALTH CARE EDUCATION/TRAINING PROGRAM

## 2023-06-14 PROCEDURE — 87624 HPV HI-RISK TYP POOLED RSLT: CPT

## 2023-06-14 PROCEDURE — 99396 PREV VISIT EST AGE 40-64: CPT | Performed by: STUDENT IN AN ORGANIZED HEALTH CARE EDUCATION/TRAINING PROGRAM

## 2023-06-14 PROCEDURE — 88175 CYTOPATH C/V AUTO FLUID REDO: CPT

## 2023-06-14 PROCEDURE — 3078F DIAST BP <80 MM HG: CPT | Performed by: STUDENT IN AN ORGANIZED HEALTH CARE EDUCATION/TRAINING PROGRAM

## 2023-06-14 NOTE — PROGRESS NOTES
ANNUAL GYNECOLOGY VISIT    Chief Complaint  No chief complaint on file.      Subjective  June Ortiz is a 42 y.o. female who presents today for Annual Exam.  Has very light and irregular bleeding with Mirena IUD in place.  No pelvic pain.  Has not had a mammogram performed.  Her maternal aunt was recently diagnosed with breast cancer at age 73.    Preventive Care   Immunization History   Administered Date(s) Administered    Influenza Vaccine Quad Inj (Pf) 2019    MODERNA SARS-COV-2 VACCINE (12+) 2021, 2021    Tdap Vaccine 2021     Last Mammogram: None - ordered today    Gynecology History and ROS  Current Sexual Activity: Yes  History of sexually transmitted diseases?  no  Abnormal discharge? No  Current Contraception:  Mirena IUD    Menstrual History  Patient's last menstrual period was 2023.  Periods are rare with IUD.   Clots or heavy flow: No  Dysmenorrhea: No  Intermenstrual bleeding/spotting: No  Significant pain with periods:No  Bothersome PMS symptoms: No  Significant Pelvic Pain: No      Pap History  Last pap smear: Due today  History of moderate or severe dysplasia: No    Cancer Risk Assessement:  Family history of:   - Breast cancer: yes - maternal aunt age 73   - Ovarian cancer: no   - Uterine cancer: no   - Colon cancer: no    Obstetric History  OB History    Para Term  AB Living   4 2 2   2 2   SAB IAB Ectopic Molar Multiple Live Births   1 1       2      # Outcome Date GA Lbr Raza/2nd Weight Sex Delivery Anes PTL Lv   4 Term /15    M CS-LTranv   JOEY   3 Term 12    F CS-LTranv   JOEY   2 SAB            1 IAB                Past Medical History  Past Medical History:   Diagnosis Date    Allergy     nausea/ vomiting       Past Surgical History  Past Surgical History:   Procedure Laterality Date    PRIMARY C SECTION      SKIN ABSCESS INCISION AND DRAINAGE         Social History  Social History     Socioeconomic History    Marital status:       Spouse name: Not on file    Number of children: 2    Years of education: Not on file    Highest education level: Some college, no degree   Occupational History    Not on file   Tobacco Use    Smoking status: Former     Packs/day: 0.50     Years: 10.00     Pack years: 5.00     Types: Cigarettes     Quit date:      Years since quittin.4    Smokeless tobacco: Never   Vaping Use    Vaping Use: Never used   Substance and Sexual Activity    Alcohol use: Not Currently    Drug use: Not Currently     Types: Marijuana, Methamphetamines     Comment: meth 8 years ago    Sexual activity: Yes     Partners: Male     Birth control/protection: I.U.D.     Comment: mirena    Other Topics Concern    Not on file   Social History Narrative    Not on file     Social Determinants of Health     Financial Resource Strain: Unknown (2023)    Overall Financial Resource Strain (CARDIA)     Difficulty of Paying Living Expenses: Patient refused   Food Insecurity: Unknown (2023)    Hunger Vital Sign     Worried About Running Out of Food in the Last Year: Patient refused     Ran Out of Food in the Last Year: Patient refused   Transportation Needs: No Transportation Needs (2023)    PRAPARE - Transportation     Lack of Transportation (Medical): No     Lack of Transportation (Non-Medical): No   Physical Activity: Insufficiently Active (2023)    Exercise Vital Sign     Days of Exercise per Week: 4 days     Minutes of Exercise per Session: 30 min   Stress: No Stress Concern Present (2023)    [employer] - Occupational Stress Questionnaire     Feeling of Stress : Not at all   Social Connections: Moderately Isolated (2023)    Social Connection and Isolation Panel [NHANES]     Frequency of Communication with Friends and Family: More than three times a week     Frequency of Social Gatherings with Friends and Family: Once a week     Attends Mu-ism Services: Never     Active  Member of Clubs or Organizations: No     Attends Club or Organization Meetings: Never     Marital Status:    Intimate Partner Violence: Not on file   Housing Stability: Unknown (6/5/2023)    Housing Stability Vital Sign     Unable to Pay for Housing in the Last Year: Patient refused     Number of Places Lived in the Last Year: 1     Unstable Housing in the Last Year: No       Family History  Family History   Problem Relation Age of Onset    Other Mother         hyperthyroidism    Cancer Mother         Skin    Diabetes Mother     Cancer Father         Prostate    Heart Disease Father         CABG at age of 66    No Known Problems Brother     Diabetes Maternal Aunt     Cancer Maternal Aunt         Breast    Breast Cancer Maternal Aunt     Stroke Paternal Grandmother     Cancer Paternal Grandmother     Lung Disease Paternal Grandmother        Home Medications  Current Outpatient Medications on File Prior to Visit   Medication Sig Dispense Refill    loratadine (CLARITIN) 10 MG Tab Take 10 mg by mouth every day.      MAGNESIUM OXIDE -MG SUPPLEMENT PO Take  by mouth.      levothyroxine (SYNTHROID) 25 MCG Tab Take 1 Tablet by mouth every morning on an empty stomach. 30 Tablet 2    Multiple Vitamins-Minerals (MULTI-VITAMIN GUMMIES PO) Take  by mouth.       No current facility-administered medications on file prior to visit.       Allergies/Reactions  Allergies   Allergen Reactions    Morphine Vomiting       ROS  Positive ROS: none  Gen: no fevers or chills, no significant weight loss or gain, excessive fatigue  Respiratory:  no cough or dyspnea  Cardiac:  no chest pain, no palpitations, no syncope  Breast: no breast discharge, pain, lump or skin changes  GI:  no heartburn, no abdominal pain, no nausea or vomiting  Urinary: no dysuria, urgency, frequency, incontinence   Psych: no depression or anxiety  Neuro: no migraines with aura, fainting spells, numbness or tingling  Extremities: no joint pain, persistently  swollen ankles, recurrent leg cramps      Physical Examination:  Vital Signs:   Vitals:    06/14/23 1120   BP: 118/73   BP Location: Right arm   Patient Position: Sitting   BP Cuff Size: Adult   Weight: 193 lb 9.6 oz     Body mass index is 35.41 kg/m².    Constitutional: The patient is well developed and well nourished.  Psychiatric: Patient is oriented to time place and person.   Skin: No rash observed.  Neck: Appears symmetric. Thyroid normal size  Respiratory: normal effort  Breast: Inspection reveals no asymetry or nipple discharge, no skin thickening, dimpling or erythema.  Palpation demonstrates no masses.  Abdomen: Soft, non-tender.  Pelvic Exam:     Vulva: external female genitalia are normal in appearance. No lesions    Urethra - no lesions, no erythema    Vagina: moist, pink, normal ruggae    Cervix: pink, smooth, no lesions, no CMT, IUD strings visualized    Uterus - non-tender, normal size, shape, contour, mobile, anteverted    Ovaries: non-tender, no appreciable masses  Pap Smear performed: Yes  Extremeties: Legs are symmetric and without tenderness. There is no edema present.    Labs/Imaging:  HDL (mg/dL)   Date Value   05/25/2023 47     LDL (mg/dL)   Date Value   05/25/2023 101 (H)     No components found for: A1C1  WBC (K/uL)   Date Value   03/05/2020 6.7     Lab Results   Component Value Date/Time    CO2 23 05/25/2023 0626    BUN 13 05/25/2023 0626     [unfilled]      Assessment & Plan  June Ortiz is a 42 y.o. female who presents today for Annual Gyn Exam.     1. Encounter for gynecological examination (general) (routine) without abnormal findings  -- exam benign  -- pap collected  -- IUD strings visualized  -- mammo order placed today  -- all questions answered    2. Screening for malignant neoplasm of cervix  -- see above  - THINPREP PAP WITH HPV; Future    3. Encounter for screening breast examination  -- see above  - MA-SCREENING MAMMO BILAT W/TOMOSYNTHESIS W/CAD;  Future        Return: Annually or PRN    Remigio Foley D.O.

## 2023-06-15 ENCOUNTER — APPOINTMENT (OUTPATIENT)
Dept: RADIOLOGY | Facility: MEDICAL CENTER | Age: 43
End: 2023-06-15
Attending: STUDENT IN AN ORGANIZED HEALTH CARE EDUCATION/TRAINING PROGRAM
Payer: COMMERCIAL

## 2023-06-15 DIAGNOSIS — Z12.39 ENCOUNTER FOR SCREENING BREAST EXAMINATION: ICD-10-CM

## 2023-06-15 PROCEDURE — 77063 BREAST TOMOSYNTHESIS BI: CPT

## 2023-06-16 PROBLEM — E03.8 SUBCLINICAL HYPOTHYROIDISM: Status: ACTIVE | Noted: 2023-06-16

## 2023-06-17 NOTE — ASSESSMENT & PLAN NOTE
Chronic condition. Patient reports symptoms of under active thyroid. She has a family history as well.     Will repeat thyroid labs. Will start levothyroxine 25mcg daily to see if patient has improvement in her symptoms.

## 2023-07-12 ENCOUNTER — APPOINTMENT (OUTPATIENT)
Dept: MEDICAL GROUP | Facility: PHYSICIAN GROUP | Age: 43
End: 2023-07-12
Payer: COMMERCIAL

## 2023-11-06 ENCOUNTER — HOSPITAL ENCOUNTER (OUTPATIENT)
Facility: MEDICAL CENTER | Age: 43
End: 2023-11-06
Attending: FAMILY MEDICINE
Payer: COMMERCIAL

## 2023-11-06 ENCOUNTER — APPOINTMENT (OUTPATIENT)
Dept: MEDICAL GROUP | Facility: PHYSICIAN GROUP | Age: 43
End: 2023-11-06
Payer: COMMERCIAL

## 2023-11-06 ENCOUNTER — OFFICE VISIT (OUTPATIENT)
Dept: URGENT CARE | Facility: PHYSICIAN GROUP | Age: 43
End: 2023-11-06
Payer: COMMERCIAL

## 2023-11-06 VITALS
DIASTOLIC BLOOD PRESSURE: 70 MMHG | BODY MASS INDEX: 34.74 KG/M2 | SYSTOLIC BLOOD PRESSURE: 108 MMHG | WEIGHT: 188.8 LBS | RESPIRATION RATE: 16 BRPM | HEART RATE: 72 BPM | OXYGEN SATURATION: 95 % | HEIGHT: 62 IN | TEMPERATURE: 98.5 F

## 2023-11-06 DIAGNOSIS — Z86.14 HISTORY OF METHICILLIN RESISTANT STAPHYLOCOCCUS AUREUS (MRSA): ICD-10-CM

## 2023-11-06 DIAGNOSIS — L03.314 CELLULITIS OF GROIN: ICD-10-CM

## 2023-11-06 PROCEDURE — 3078F DIAST BP <80 MM HG: CPT | Performed by: FAMILY MEDICINE

## 2023-11-06 PROCEDURE — 3074F SYST BP LT 130 MM HG: CPT | Performed by: FAMILY MEDICINE

## 2023-11-06 PROCEDURE — 87070 CULTURE OTHR SPECIMN AEROBIC: CPT

## 2023-11-06 PROCEDURE — 87076 CULTURE ANAEROBE IDENT EACH: CPT

## 2023-11-06 PROCEDURE — 87205 SMEAR GRAM STAIN: CPT

## 2023-11-06 PROCEDURE — 99214 OFFICE O/P EST MOD 30 MIN: CPT | Performed by: FAMILY MEDICINE

## 2023-11-06 RX ORDER — SULFAMETHOXAZOLE AND TRIMETHOPRIM 800; 160 MG/1; MG/1
1 TABLET ORAL 2 TIMES DAILY
Qty: 14 TABLET | Refills: 0 | Status: SHIPPED | OUTPATIENT
Start: 2023-11-06 | End: 2024-03-05

## 2023-11-06 RX ORDER — LEVOTHYROXINE SODIUM 0.03 MG/1
25 TABLET ORAL
COMMUNITY
End: 2024-03-05

## 2023-11-06 ASSESSMENT — ENCOUNTER SYMPTOMS
COUGH: 0
SORE THROAT: 0
NAUSEA: 0
MYALGIAS: 0
VOMITING: 0
CHILLS: 0
FEVER: 0
SHORTNESS OF BREATH: 0

## 2023-11-06 NOTE — PROGRESS NOTES
Subjective:   June Ortiz is a 43 y.o. female who presents for Pelvic Pain (Intermittent pain, swelling, redness, possible MRSA, x2 days )        Rash  This is a new (Reports left-sided groin rash over the past 2 days, reports redness, swelling, intermittent drainage, history of MRSA 15 years prior) problem. The current episode started in the past 7 days (2 days). The problem is unchanged. Location: Left-sided groin. The rash is characterized by pain, redness and swelling. She was exposed to nothing. Pertinent negatives include no cough, fever, shortness of breath, sore throat or vomiting. Treatments tried: Valacyclovir antiviral. The treatment provided no relief.     PMH:  has a past medical history of Allergy and Subclinical hypothyroidism (6/16/2023).    She has no past medical history of Addisons disease ([facility]), Adrenal disorder ([facility]), Anemia, Anxiety, Arrhythmia, Arthritis, Asthma, Blood transfusion without reported diagnosis, Cancer ([facility]), Cataract, CHF (congestive heart failure) ([facility]), Clotting disorder ([facility]), COPD (chronic obstructive pulmonary disease) ([facility]), Cushings syndrome ([facility]), Depression, Diabetes ([facility]), Diabetic neuropathy ([facility]), Encounter for long-term (current) use of other medications, GERD (gastroesophageal reflux disease), Glaucoma, Goiter, Head ache, Heart attack ([facility]), Heart murmur, HIV (human immunodeficiency virus infection) ([facility]), Hyperlipidemia, Hypertension, IBD (inflammatory bowel disease), Kidney disease, Meningitis, Migraine, Muscle disorder, Osteoporosis, Parathyroid disorder ([facility]), Pituitary disease ([facility]), Pulmonary emphysema ([facility]), Seizure ([facility]), Sickle cell disease ([facility]), Stroke ([facility]), Substance abuse ([facility]), Tuberculosis, or Urinary tract infection.  MEDS:   Current Outpatient Medications:     sulfamethoxazole-trimethoprim (BACTRIM DS) 800-160 MG tablet, Take 1 Tablet by mouth 2 times a day., Disp: 14 Tablet, Rfl: 0    mupirocin (BACTROBAN) 2 % Ointment, Apply 1  Application topically 2 times a day., Disp: 22 g, Rfl: 0    loratadine (CLARITIN) 10 MG Tab, Take 10 mg by mouth every day., Disp: , Rfl:     MAGNESIUM OXIDE -MG SUPPLEMENT PO, Take  by mouth., Disp: , Rfl:     Multiple Vitamins-Minerals (MULTI-VITAMIN GUMMIES PO), Take  by mouth., Disp: , Rfl:     levothyroxine (SYNTHROID) 25 MCG Tab, Take 25 mcg by mouth. (Patient not taking: Reported on 11/6/2023), Disp: , Rfl:     levothyroxine (SYNTHROID) 25 MCG Tab, TAKE 1 TABLET BY MOUTH EVERY MORNING ON AN EMPTY STOMACH (Patient not taking: Reported on 11/6/2023), Disp: 90 Tablet, Rfl: 1  ALLERGIES:   Allergies   Allergen Reactions    Morphine Vomiting     SURGHX:   Past Surgical History:   Procedure Laterality Date    PRIMARY C SECTION      SKIN ABSCESS INCISION AND DRAINAGE       SOCHX:  reports that she quit smoking about 12 years ago. Her smoking use included cigarettes. She started smoking about 22 years ago. She has a 5.0 pack-year smoking history. She has never used smokeless tobacco. She reports that she does not currently use alcohol. She reports that she does not currently use drugs after having used the following drugs: Marijuana and Methamphetamines.  FH:   Family History   Problem Relation Age of Onset    Other Mother         hyperthyroidism    Cancer Mother         Skin    Diabetes Mother     Cancer Father         Prostate    Heart Disease Father         CABG at age of 66    No Known Problems Brother     Diabetes Maternal Aunt     Cancer Maternal Aunt         Breast    Breast Cancer Maternal Aunt     Stroke Paternal Grandmother     Cancer Paternal Grandmother     Lung Disease Paternal Grandmother      Review of Systems   Constitutional:  Negative for chills and fever.   HENT:  Negative for sore throat.    Respiratory:  Negative for cough and shortness of breath.    Gastrointestinal:  Negative for nausea and vomiting.   Musculoskeletal:  Negative for myalgias.   Skin:  Positive for rash.        Objective:   BP  "108/70 (BP Location: Left arm, Patient Position: Sitting, BP Cuff Size: Adult)   Pulse 72   Temp 36.9 °C (98.5 °F) (Temporal)   Resp 16   Ht 1.575 m (5' 2\")   Wt 85.6 kg (188 lb 12.8 oz)   SpO2 95%   BMI 34.53 kg/m²   Physical Exam  Vitals and nursing note reviewed. Exam conducted with a chaperone present (Archana, medical assistant).   Constitutional:       General: She is not in acute distress.     Appearance: She is well-developed.   HENT:      Head: Normocephalic and atraumatic.      Right Ear: External ear normal.      Left Ear: External ear normal.      Nose: Nose normal.      Mouth/Throat:      Mouth: Mucous membranes are moist.   Eyes:      Conjunctiva/sclera: Conjunctivae normal.   Cardiovascular:      Rate and Rhythm: Normal rate.   Pulmonary:      Effort: Pulmonary effort is normal. No respiratory distress.      Breath sounds: Normal breath sounds.   Abdominal:      General: There is no distension.       Musculoskeletal:         General: Normal range of motion.   Skin:     General: Skin is warm and dry.   Neurological:      General: No focal deficit present.      Mental Status: She is alert and oriented to person, place, and time. Mental status is at baseline.      Gait: Gait (gait at baseline) normal.   Psychiatric:         Judgment: Judgment normal.           Assessment/Plan:   1. Cellulitis of groin  - sulfamethoxazole-trimethoprim (BACTRIM DS) 800-160 MG tablet; Take 1 Tablet by mouth 2 times a day.  Dispense: 14 Tablet; Refill: 0  - mupirocin (BACTROBAN) 2 % Ointment; Apply 1 Application topically 2 times a day.  Dispense: 22 g; Refill: 0  - CULTURE WOUND W/ GRAM STAIN; Future    2. History of methicillin resistant staphylococcus aureus (MRSA)  - CULTURE WOUND W/ GRAM STAIN; Future    Other orders  - levothyroxine (SYNTHROID) 25 MCG Tab; Take 25 mcg by mouth. (Patient not taking: Reported on 11/6/2023)        Medical Decision Making/Course:  In the course of preparing for this visit with review " of the pertinent past medical history, recent and past clinic visits, current medications, and performing chart, immunization, medical history and medication reconciliation, and in the further course of obtaining the current history pertinent to the clinic visit today, performing an exam and evaluation, ordering and independently evaluating labs, tests including wound culture, and/or procedures, prescribing any recommended new medications as noted above, providing any pertinent counseling and education and recommending further coordination of care including recommendations for symptomatic and supportive measures and recommendations return for any persistent or worsening symptoms, at least  32 minutes of total time were spent during this encounter.      Discussed close monitoring, return precautions, and supportive measures of maintaining adequate fluid hydration and caloric intake, relative rest and symptom management as needed for pain and/or fever.    Differential diagnosis, natural history, supportive care, and indications for immediate follow-up discussed.     Advised the patient to follow-up with the primary care physician for recheck, reevaluation, and consideration of further management.    Please note that this dictation was created using voice recognition software. I have worked with consultants from the vendor as well as technical experts from [facility] to optimize the interface. I have made every reasonable attempt to correct obvious errors, but I expect that there are errors of grammar and possibly content that I did not discover before finalizing the note.

## 2023-11-06 NOTE — PATIENT INSTRUCTIONS
Cellulitis, Adult    Cellulitis is a skin infection. The infected area is often warm, red, swollen, and sore. It occurs most often in the arms and lower legs. It is very important to get treated for this condition.  What are the causes?  This condition is caused by bacteria. The bacteria enter through a break in the skin, such as a cut, burn, insect bite, open sore, or crack.  What increases the risk?  This condition is more likely to occur in people who:  Have a weak body defense system (immune system).  Have open cuts, burns, bites, or scrapes on the skin.  Are older than 60 years of age.  Have a blood sugar problem (diabetes).  Have a long-lasting (chronic) liver disease (cirrhosis) or kidney disease.  Are very overweight (obese).  Have a skin problem, such as:  Itchy rash (eczema).  Slow movement of blood in the veins (venous stasis).  Fluid buildup below the skin (edema).  Have been treated with high-energy rays (radiation).  Use IV drugs.  What are the signs or symptoms?  Symptoms of this condition include:  Skin that is:  Red.  Streaking.  Spotting.  Swollen.  Sore or painful when you touch it.  Warm.  A fever.  Chills.  Blisters.  How is this diagnosed?  This condition is diagnosed based on:  Medical history.  Physical exam.  Blood tests.  Imaging tests.  How is this treated?  Treatment for this condition may include:  Medicines to treat infections or allergies.  Home care, such as:  Rest.  Placing cold or warm cloths (compresses) on the skin.  Hospital care, if the condition is very bad.  Follow these instructions at home:  Medicines  Take over-the-counter and prescription medicines only as told by your doctor.  If you were prescribed an antibiotic medicine, take it as told by your doctor. Do not stop taking it even if you start to feel better.  General instructions    Drink enough fluid to keep your pee (urine) pale yellow.  Do not touch or rub the infected area.  Raise (elevate) the infected area above  the level of your heart while you are sitting or lying down.  Place cold or warm cloths on the area as told by your doctor.  Keep all follow-up visits as told by your doctor. This is important.  Contact a doctor if:  You have a fever.  You do not start to get better after 1-2 days of treatment.  Your bone or joint under the infected area starts to hurt after the skin has healed.  Your infection comes back. This can happen in the same area or another area.  You have a swollen bump in the area.  You have new symptoms.  You feel ill and have muscle aches and pains.  Get help right away if:  Your symptoms get worse.  You feel very sleepy.  You throw up (vomit) or have watery poop (diarrhea) for a long time.  You see red streaks coming from the area.  Your red area gets larger.  Your red area turns dark in color.  These symptoms may represent a serious problem that is an emergency. Do not wait to see if the symptoms will go away. Get medical help right away. Call your local emergency services (911 in the [place] Do not drive yourself to the hospital.  Summary  Cellulitis is a skin infection. The area is often warm, red, swollen, and sore.  This condition is treated with medicines, rest, and cold and warm cloths.  Take all medicines only as told by your doctor.  Tell your doctor if symptoms do not start to get better after 1-2 days of treatment.  This information is not intended to replace advice given to you by your health care provider. Make sure you discuss any questions you have with your health care provider.  Document Revised: 09/29/2022 Document Reviewed: 09/29/2022  Elsevier Patient Education © 2023 [employer]

## 2023-11-07 ENCOUNTER — APPOINTMENT (OUTPATIENT)
Dept: MEDICAL GROUP | Facility: PHYSICIAN GROUP | Age: 43
End: 2023-11-07
Payer: COMMERCIAL

## 2023-11-07 LAB
GRAM STN SPEC: NORMAL
SIGNIFICANT IND 70042: NORMAL
SITE SITE: NORMAL
SOURCE SOURCE: NORMAL

## 2023-11-10 LAB
BACTERIA WND AEROBE CULT: ABNORMAL
BACTERIA WND AEROBE CULT: ABNORMAL
GRAM STN SPEC: ABNORMAL
SIGNIFICANT IND 70042: ABNORMAL
SITE SITE: ABNORMAL
SOURCE SOURCE: ABNORMAL

## 2023-11-28 DIAGNOSIS — B00.9 HSV INFECTION: ICD-10-CM

## 2023-11-28 RX ORDER — VALACYCLOVIR HYDROCHLORIDE 1 G/1
1000 TABLET, FILM COATED ORAL 2 TIMES DAILY
Qty: 20 TABLET | Refills: 1 | Status: SHIPPED | OUTPATIENT
Start: 2023-11-28 | End: 2024-01-30 | Stop reason: SDUPTHER

## 2024-01-30 DIAGNOSIS — B00.9 HSV INFECTION: ICD-10-CM

## 2024-02-07 RX ORDER — VALACYCLOVIR HYDROCHLORIDE 1 G/1
1000 TABLET, FILM COATED ORAL 2 TIMES DAILY
Qty: 20 TABLET | Refills: 1 | Status: SHIPPED | OUTPATIENT
Start: 2024-02-07 | End: 2024-03-05 | Stop reason: SDUPTHER

## 2024-03-05 ENCOUNTER — OFFICE VISIT (OUTPATIENT)
Dept: MEDICAL GROUP | Facility: PHYSICIAN GROUP | Age: 44
End: 2024-03-05
Payer: COMMERCIAL

## 2024-03-05 VITALS
TEMPERATURE: 98.4 F | OXYGEN SATURATION: 96 % | HEART RATE: 64 BPM | BODY MASS INDEX: 34.96 KG/M2 | DIASTOLIC BLOOD PRESSURE: 72 MMHG | SYSTOLIC BLOOD PRESSURE: 118 MMHG | HEIGHT: 62 IN | WEIGHT: 190 LBS | RESPIRATION RATE: 16 BRPM

## 2024-03-05 DIAGNOSIS — B00.9 HSV INFECTION: ICD-10-CM

## 2024-03-05 DIAGNOSIS — L73.9 FOLLICULITIS: ICD-10-CM

## 2024-03-05 PROCEDURE — 99213 OFFICE O/P EST LOW 20 MIN: CPT | Performed by: NURSE PRACTITIONER

## 2024-03-05 PROCEDURE — 3074F SYST BP LT 130 MM HG: CPT | Performed by: NURSE PRACTITIONER

## 2024-03-05 PROCEDURE — 3078F DIAST BP <80 MM HG: CPT | Performed by: NURSE PRACTITIONER

## 2024-03-05 RX ORDER — VALACYCLOVIR HYDROCHLORIDE 1 G/1
1000 TABLET, FILM COATED ORAL 2 TIMES DAILY
Qty: 20 TABLET | Refills: 5 | Status: SHIPPED | OUTPATIENT
Start: 2024-03-05

## 2024-03-05 RX ORDER — DOXYCYCLINE HYCLATE 100 MG
100 TABLET ORAL 2 TIMES DAILY
Qty: 14 TABLET | Refills: 0 | Status: SHIPPED | OUTPATIENT
Start: 2024-03-05 | End: 2024-03-12

## 2024-03-05 ASSESSMENT — PATIENT HEALTH QUESTIONNAIRE - PHQ9: CLINICAL INTERPRETATION OF PHQ2 SCORE: 0

## 2024-03-05 NOTE — PROGRESS NOTES
"  Chief Complaint   Patient presents with    Sore     Open sore on left groin area/ was seen at  11/6/24                                                                                                                                       HPI:   June presents today with the following.    Problem   Folliculitis         Current Outpatient Medications   Medication Sig Dispense Refill    valacyclovir (VALTREX) 1 GM Tab Take 1 Tablet by mouth 2 times a day. 20 Tablet 5    loratadine (CLARITIN) 10 MG Tab Take 10 mg by mouth every day.      MAGNESIUM OXIDE -MG SUPPLEMENT PO Take  by mouth.      Multiple Vitamins-Minerals (MULTI-VITAMIN GUMMIES PO) Take  by mouth.       No current facility-administered medications for this visit.       Allergies as of 03/05/2024 - Reviewed 03/05/2024   Allergen Reaction Noted    Morphine Vomiting 12/23/2019        ROS:  All systems negative expect as addressed in assessment and plan.     /72 (BP Location: Left arm, Patient Position: Sitting)   Pulse 64   Temp 36.9 °C (98.4 °F) (Temporal)   Resp 16   Ht 1.575 m (5' 2\")   Wt 86.2 kg (190 lb)   LMP 03/04/2024 (Exact Date)   SpO2 96%   BMI 34.75 kg/m²     Physical Exam:    Physical Exam  Skin:     Findings: Erythema and lesion present.                  Assessment and Plan:  43 y.o. female with the following issues.    1. Folliculitis  doxycycline (VIBRAMYCIN) 100 MG Tab      2. HSV infection  valacyclovir (VALTREX) 1 GM Tab           Folliculitis  Patient reports having a lesion on her pelvic region near her bikini line for the last several weeks. She was seen in urgent care when the lesion first appeared. She was provided with bactrim for cellulitis and a wound culture was obtained.     Reviewed wound culture and  notes.     Patient continues to have open wound. She describes the lesion as fluid filled blisters. When the lesion is open it drains a clear fluid.     Assessment in office shows a few vesicle like lesions. " Surrounding skin is erythematous and swollen. Patient reports that area is tender to touch.     Discussed possible etiology of folliculitis vs herpetic dermatitis.     Start doxycycline 100mg BID for 7 days. Will provide patient with valtrex 1g BID to use if no improvement in lesion.       Return if symptoms worsen or fail to improve.      Please note that this dictation was created using voice recognition software. I have worked with consultants from the vendor as well as technical experts from [facility] [employer] to optimize the interface. I have made every reasonable attempt to correct obvious errors, but I expect that there are errors of grammar and possibly content that I did not discover before finalizing the note.

## 2024-03-15 NOTE — ASSESSMENT & PLAN NOTE
Patient reports having a lesion on her pelvic region near her bikini line for the last several weeks. She was seen in urgent care when the lesion first appeared. She was provided with bactrim for cellulitis and a wound culture was obtained.     Reviewed wound culture and UC notes.     Patient continues to have open wound. She describes the lesion as fluid filled blisters. When the lesion is open it drains a clear fluid.     Assessment in office shows a few vesicle like lesions. Surrounding skin is erythematous and swollen. Patient reports that area is tender to touch.     Discussed possible etiology of folliculitis vs herpetic dermatitis.     Start doxycycline 100mg BID for 7 days. Will provide patient with valtrex 1g BID to use if no improvement in lesion.

## 2025-04-15 ENCOUNTER — GYNECOLOGY VISIT (OUTPATIENT)
Dept: OBGYN | Facility: CLINIC | Age: 45
End: 2025-04-15
Payer: COMMERCIAL

## 2025-04-15 VITALS
WEIGHT: 196.9 LBS | BODY MASS INDEX: 32.8 KG/M2 | HEIGHT: 65 IN | SYSTOLIC BLOOD PRESSURE: 116 MMHG | DIASTOLIC BLOOD PRESSURE: 52 MMHG

## 2025-04-15 DIAGNOSIS — Z86.19 HISTORY OF HERPES GENITALIS: ICD-10-CM

## 2025-04-15 DIAGNOSIS — Z12.31 BREAST CANCER SCREENING BY MAMMOGRAM: ICD-10-CM

## 2025-04-15 DIAGNOSIS — Z00.00 WELLNESS EXAMINATION: ICD-10-CM

## 2025-04-15 PROCEDURE — 99396 PREV VISIT EST AGE 40-64: CPT | Performed by: STUDENT IN AN ORGANIZED HEALTH CARE EDUCATION/TRAINING PROGRAM

## 2025-04-15 PROCEDURE — 3074F SYST BP LT 130 MM HG: CPT | Performed by: STUDENT IN AN ORGANIZED HEALTH CARE EDUCATION/TRAINING PROGRAM

## 2025-04-15 PROCEDURE — 3078F DIAST BP <80 MM HG: CPT | Performed by: STUDENT IN AN ORGANIZED HEALTH CARE EDUCATION/TRAINING PROGRAM

## 2025-04-15 RX ORDER — VALACYCLOVIR HYDROCHLORIDE 1 G/1
1000 TABLET, FILM COATED ORAL
Qty: 30 TABLET | Refills: 5 | Status: SHIPPED | OUTPATIENT
Start: 2025-04-15

## 2025-04-15 NOTE — ASSESSMENT & PLAN NOTE
- Pt requests for refill of valacyclovir, takes as needed for infrequent outbreaks.   Orders:    valacyclovir (VALTREX) 1 GM Tab; Take 1 Tablet by mouth 1 time a day as needed (for 5 days. Start asap within 24 hours of symptom onset).

## 2025-04-15 NOTE — PROGRESS NOTES
Pt presents for annual exam   Last seen on: 6/14/23 Og  Phone: [phone]   Pharmacy verified   LMP: none with IUD  BCM: Mirena IUD (placed 4/28/2020)  Sexually active:   Last pap: 6/14/2023 NILM, HPV negative   ^ hx of abnormal's?  Last LINDA: 6/15/2023 fibroglandular density all benign calcifications  Pt states would like to have IUD replaced today as she is not due for

## 2025-04-15 NOTE — PROGRESS NOTES
ANNUAL GYNECOLOGY VISIT    Chief Complaint  Annual Exam    Subjective  June Za Ortiz is a 44 y.o. female  No LMP recorded. Patient has had an implant. on IUD Mirena (placed 2020) for contraception who presents today for Annual Exam and possible replacement of IUD Mirena.     Patient previously informed about needing IUD Mirena to be replaced every 5 years. However, we discussed that it has recently been approved for pregnancy prevention for 8 years and management of heavy menstrual bleeding for 5 years.  She currently does not have any menses ever since having IUD Mirena placed. No pelvic/abd pain. She is otherwise doing well, no concerns.     She requests for refill of valacyclovir to take as needed for genital herpes outbreaks (gets less than 5 outbreaks a year) and orders for mammogram for breast CA screening.     Preventive Care   Immunization History   Administered Date(s) Administered    Influenza Vaccine Quad Inj (Pf) 2019    MODERNA SARS-COV-2 VACCINE (12+) 2021, 2021    Tdap Vaccine 2021     Last Mammogram: 6/15/2023 Benign with fibroglandular density, all benign calcifications. Due for one today.   Last Colonoscopy: Not indicated  Last DEXA: Not indicated  HPV vaccine: did not have  Last Pap: 2023 NILM, HPV negative   History of abnormal pap: no  Intimate partner violence (patient interviewed when partner was not in the room): Negative      Gynecology History  Current Sexual Activity: yes - 1  Partners: Male  History of sexually transmitted diseases? Genital Herpes.  Patient requests for refill, takes as needed.   Abnormal vaginal discharge? no  Perimenopause/menopausal sx: No  Significant pelvic pain: No  Urinary incontinence: no    Menstrual History  No LMP recorded. Patient has had an implant. No menses ever since having IUD Mirena.       Contraception  Current: IUD Mirena   Past: Combined hormone contraception - side effects: mood swings    Cancer  Risk Assessement:  Family history of:   - Breast cancer: Maternal aunt    - Ovarian cancer: no   - Uterine cancer: no   - Colon cancer: no    Obstetric History  OB History    Para Term  AB Living   4 2 2  2 2   SAB IAB Ectopic Molar Multiple Live Births   1 1    2      # Outcome Date GA Lbr Raza/2nd Weight Sex Type Anes PTL Lv   4 Term 04/20/15    M CS-LTranv   JOEY   3 Term 12    F CS-LTranv   JOEY   2 SAB            1 IAB                Past Medical History  Past Medical History:   Diagnosis Date    Allergy     nausea/ vomiting    Subclinical hypothyroidism 2023       Past Surgical History  Past Surgical History:   Procedure Laterality Date    PRIMARY C SECTION      SKIN ABSCESS INCISION AND DRAINAGE         Social History  Social History     Tobacco Use    Smoking status: Former     Current packs/day: 0.00     Average packs/day: 0.5 packs/day for 10.0 years (5.0 ttl pk-yrs)     Types: Cigarettes     Start date:      Quit date:      Years since quittin.2    Smokeless tobacco: Never   Vaping Use    Vaping status: Never Used   Substance Use Topics    Alcohol use: Not Currently    Drug use: Not Currently     Types: Marijuana, Methamphetamines     Comment: meth 8 years ago        Family History  Family History   Problem Relation Age of Onset    Other Mother         hyperthyroidism    Cancer Mother         Skin    Diabetes Mother     Cancer Father         Prostate    Heart Disease Father         CABG at age of 66    No Known Problems Brother     Diabetes Maternal Aunt     Cancer Maternal Aunt         Breast    Breast Cancer Maternal Aunt     Stroke Paternal Grandmother     Cancer Paternal Grandmother     Lung Disease Paternal Grandmother        Home Medications  Current Outpatient Medications   Medication Sig    loratadine (CLARITIN) 10 MG Tab Take 10 mg by mouth every day.    MAGNESIUM OXIDE -MG SUPPLEMENT PO Take  by mouth.    Multiple Vitamins-Minerals (MULTI-VITAMIN GUMMIES PO)  "Take  by mouth.    valacyclovir (VALTREX) 1 GM Tab Take 1 Tablet by mouth 2 times a day.       Allergies/Reactions  Allergies   Allergen Reactions    Morphine Vomiting       ROS  Review of Systems:  Gen: no fevers or chills, no significant weight loss or gain  Respiratory:  no cough or dyspnea  Cardiac:  no chest pain, no palpitations, no syncope  Breast: no breast discharge, pain, lump or skin changes  GI:  no heartburn, no abdominal pain, no nausea or vomiting  Psych: no depression or anxiety  Neuro: no migraines with aura, fainting spells, numbness or tingling    Objective  Ht 5' 5\"   Wt 196 lb 14.4 oz   BMI 32.77 kg/m²     Constitutional: The patient is well developed and well nourished.  Psychiatric: Patient is oriented to time place and person.   Skin: No rash observed.  Neck: Appears symmetric. Thyroid normal size  Respiratory: normal effort  Breast: Declined  Abdomen: Soft, non-tender.  Pelvic Exam: Declined  Extremities: Legs are symmetric and without tenderness. There is no edema present.    Labs/Imaging:  HDL (mg/dL)   Date Value   05/25/2023 47     LDL (mg/dL)   Date Value   05/25/2023 101 (H)     No components found for: \"A1C1\"  WBC (K/uL)   Date Value   03/05/2020 6.7     Lab Results   Component Value Date/Time    CO2 23 05/25/2023 0626    BUN 13 05/25/2023 0626       Patient Active Problem List    Diagnosis Date Noted    Folliculitis 03/05/2024    Subclinical hypothyroidism 06/16/2023    Environmental allergies 06/05/2023    Vitamin D deficiency 12/29/2022    Obesity (BMI 30.0-34.9) 12/23/2019       Assessment & Plan  June Ortiz is a 44 y.o. female who presents today for Annual Gyn Exam.     Assessment & Plan  Wellness examination  - Cervical cancer screening: Pap: Up-to-date. Last Pap: 6/14/2023 NILM, HPV negative  No hx of abnormal Paps.  - STI Screen (HIV, Syphilis, Chlamydia / Gonorrhea): declined  - Mammogram: Ordered today  Last mammogram done 6/15/2023: Benign with " fibroglandular density, all benign calcifications  - Colonoscopy: Not indicated  - DEXA: Not indicated   - Tobacco: Encouraged continued abstinence.  - Diabetes Screen: managed by PCP  - Cholesterol Screen: managed by PCP  - Counseling: breast self exam, mammography screening, and discussed IUD Mirena approved for 8 years pregnancy prevention, and 5 years for management of heavy menses. Patient desires keeping IUD Mirena until expires in 2028 or until heavy menses returns.   - Anticipatory guidance given. Encouraged adequate water intake, healthy diet, regular exercise. Educated on Pap smear screening and guidelines for age per ACOG and ASCCP. Discussed safe sex, STI prevention, contraception/family planning. Self breast exam taught.        Breast cancer screening by mammogram  - Mammogram: Ordered today  Last mammogram done 6/15/2023: Benign with fibroglandular density, all benign calcifications  Orders:    MA-SCREENING MAMMO BILAT W/TOMOSYNTHESIS W/CAD; Future    History of herpes genitalis  - Pt requests for refill of valacyclovir, takes as needed for infrequent outbreaks.   Orders:    valacyclovir (VALTREX) 1 GM Tab; Take 1 Tablet by mouth 1 time a day as needed (for 5 days. Start asap within 24 hours of symptom onset).    Return: Annually or PRN    Ela Max P.A.-C.  [facility]   4/15/2025

## 2025-04-24 ENCOUNTER — APPOINTMENT (OUTPATIENT)
Dept: RADIOLOGY | Facility: MEDICAL CENTER | Age: 45
End: 2025-04-24
Attending: STUDENT IN AN ORGANIZED HEALTH CARE EDUCATION/TRAINING PROGRAM
Payer: COMMERCIAL

## 2025-04-24 DIAGNOSIS — Z12.31 BREAST CANCER SCREENING BY MAMMOGRAM: ICD-10-CM

## 2025-04-24 PROCEDURE — 77067 SCR MAMMO BI INCL CAD: CPT

## 2025-04-28 ENCOUNTER — RESULTS FOLLOW-UP (OUTPATIENT)
Dept: OBGYN | Facility: CLINIC | Age: 45
End: 2025-04-28
Payer: COMMERCIAL

## 2025-06-25 ENCOUNTER — OFFICE VISIT (OUTPATIENT)
Dept: MEDICAL GROUP | Facility: PHYSICIAN GROUP | Age: 45
End: 2025-06-25
Payer: COMMERCIAL

## 2025-06-25 VITALS
RESPIRATION RATE: 12 BRPM | OXYGEN SATURATION: 96 % | HEART RATE: 79 BPM | HEIGHT: 65 IN | SYSTOLIC BLOOD PRESSURE: 116 MMHG | BODY MASS INDEX: 32.99 KG/M2 | TEMPERATURE: 97.8 F | DIASTOLIC BLOOD PRESSURE: 74 MMHG | WEIGHT: 198 LBS

## 2025-06-25 DIAGNOSIS — Z13.220 LIPID SCREENING: ICD-10-CM

## 2025-06-25 DIAGNOSIS — K11.8 MASS OF RIGHT SUBMANDIBULAR GLAND: ICD-10-CM

## 2025-06-25 DIAGNOSIS — Z13.0 SCREENING FOR ENDOCRINE, METABOLIC AND IMMUNITY DISORDER: ICD-10-CM

## 2025-06-25 DIAGNOSIS — Z13.1 DIABETES MELLITUS SCREENING: ICD-10-CM

## 2025-06-25 DIAGNOSIS — E03.8 SUBCLINICAL HYPOTHYROIDISM: ICD-10-CM

## 2025-06-25 DIAGNOSIS — E66.811 OBESITY (BMI 30.0-34.9): Primary | ICD-10-CM

## 2025-06-25 DIAGNOSIS — Z13.0 SCREENING FOR DEFICIENCY ANEMIA: ICD-10-CM

## 2025-06-25 DIAGNOSIS — Z13.228 SCREENING FOR ENDOCRINE, METABOLIC AND IMMUNITY DISORDER: ICD-10-CM

## 2025-06-25 DIAGNOSIS — Z13.29 SCREENING FOR ENDOCRINE, METABOLIC AND IMMUNITY DISORDER: ICD-10-CM

## 2025-06-25 PROBLEM — L73.9 FOLLICULITIS: Status: RESOLVED | Noted: 2024-03-05 | Resolved: 2025-06-25

## 2025-06-25 PROCEDURE — 3074F SYST BP LT 130 MM HG: CPT | Performed by: FAMILY MEDICINE

## 2025-06-25 PROCEDURE — 3078F DIAST BP <80 MM HG: CPT | Performed by: FAMILY MEDICINE

## 2025-06-25 PROCEDURE — 99214 OFFICE O/P EST MOD 30 MIN: CPT | Performed by: FAMILY MEDICINE

## 2025-06-25 RX ORDER — LYSINE HCL 500 MG
500 TABLET ORAL DAILY
COMMUNITY

## 2025-06-25 ASSESSMENT — PATIENT HEALTH QUESTIONNAIRE - PHQ9: CLINICAL INTERPRETATION OF PHQ2 SCORE: 0

## 2025-06-25 NOTE — PROGRESS NOTES
Verbal consent was acquired by the patient to use [employer] ambient listening note generation during this visit YES    Subjective:     HPI:   History of Present Illness  Chief Complaint   Patient presents with    Establish Care    Requesting Labs     Been over x1 year    Bump     Has ultrasound in the past , just making sure everything is still fine, right side of throat    Weight Check     Has gained weight and has some concerns.     This is a 44-year-old female, new patient to me, here to establish care.  Patient is here to discuss the above concerns, mainly updated labs and discuss weight loss options.      The patient presents for evaluation of submandibular gland enlargement and weight management.    Submandibular gland enlargement  - She has been monitoring a palpable mass in her right submandibular region, which she reports has remained stable in size over the past 5 years.  - She is not experiencing any associated pain, redness, or heat sensation.  - There is no history of salivary gland stones or excessive dryness of the mouth.    Weight management  - She has been grappling with weight issues, despite maintaining an active lifestyle that includes walking and other physical activities several times a week.  - Her dietary habits are generally healthy, with a focus on fruits, vegetables, and protein, and a conscious effort to limit carbohydrate intake to one meal per day.  - She has noticed a trend of weight gain over the past few years.  - She is currently using an intrauterine device (IUD) for contraception and reports no symptoms suggestive of perimenopause such as hot flashes or night sweats.  - She has previously discussed potential pharmacological interventions for weight loss with Jeri, but the specifics of these discussions are unclear to her.  - She mentions that her mother and aunt have always been slightly overweight, but none of her family members are obese.    Supplemental information:  "None    FAMILY HISTORY  - Mother and aunt have hypothyroidism and are prediabetic    Wt Readings from Last 3 Encounters:   06/25/25 89.8 kg (198 lb)   04/15/25 89.3 kg (196 lb 14.4 oz)   03/05/24 86.2 kg (190 lb)            Objective:     Exam:  /74 (BP Location: Left arm, Patient Position: Sitting, BP Cuff Size: Large adult)   Pulse 79   Temp 36.6 °C (97.8 °F) (Temporal)   Resp 12   Ht 1.651 m (5' 5\")   Wt 89.8 kg (198 lb)   SpO2 96%   BMI 32.95 kg/m²  Body mass index is 32.95 kg/m².    Physical Exam  Vitals and nursing note reviewed.   Constitutional:       Appearance: Normal appearance.   HENT:      Head: Normocephalic and atraumatic.   Neck:     Cardiovascular:      Rate and Rhythm: Normal rate and regular rhythm.   Pulmonary:      Effort: Pulmonary effort is normal.      Breath sounds: Normal breath sounds.   Lymphadenopathy:      Head:      Right side of head: Submandibular adenopathy present.   Neurological:      Mental Status: She is alert. Mental status is at baseline.   Psychiatric:         Mood and Affect: Mood normal.         Results    Reviewed US Soft tissue Head/Neck results:  1/6/2020 8:59 AM     HISTORY/REASON FOR EXAM:  Mass/Lump; submandibular LN? thanks , hx of smoking 5 years , quit 8 years ago. thanks        TECHNIQUE/EXAM DESCRIPTION:  Ultrasound of the soft tissues of the head and neck.     COMPARISON:  None     FINDINGS:  Limited scanning performed in the submandibular region in the area of clinical abnormality. A right submandibular lymph node measures about 1.2 x 0.9 x 0.5 cm. The visualized portions of the right submandibular gland are unremarkable without definite   mass lesion or fluid collection. Comparison left submandibular lymph node is similar in size to the right. No significant mass or adenopathy.     IMPRESSION:     No significant sonographic abnormality.      - Imaging:    - Ultrasound of the right submandibular gland: Regular and similar to the gland on the left " side    Assessment & Plan:     1. Obesity (BMI 30.0-34.9)  Lipid Profile      2. Subclinical hypothyroidism  FREE THYROXINE    TSH      3. Lipid screening  Lipid Profile      4. Diabetes mellitus screening  HEMOGLOBIN A1C      5. Screening for deficiency anemia  CBC WITHOUT DIFFERENTIAL      6. Screening for endocrine, metabolic and immunity disorder  Comp Metabolic Panel      7. Mass of right submandibular gland  US-SOFT TISSUES OF HEAD - NECK          Assessment & Plan  1. Submandibular mass. Persistent.   - Has US in 2020 - results reviewed - normal appearing submandibular LN.   - However, mass has persisted and is obvious on exam.   - will obtain f/u Ultrasound for further evaluation.     2. Obesity/ weight management:  - Laboratory tests have been ordered to screen for diabetes, cholesterol levels, and thyroid function to rule out any underlying conditions contributing to weight gain.  - Discussed various weight loss medications, including GLP-1s like Ozempic, Contrave (a combination of Wellbutrin and naltrexone), and phentermine.  - Explained potential benefits and side effects of each medication.  - Advised to consider these options and follow up after lab results are available.    3. Screening labs - due for yearly screening labs. Has strong FH of hypothyroidism and previously was told she had subclinical hypothyroidism - will also add TSH, and free T4.    Follow-up  - Follow up in 2 to 3 weeks.            No follow-ups on file.    Please note that this dictation was created using voice recognition software. I have made every reasonable attempt to correct obvious errors, but I expect that there are errors of grammar and possibly content that I did not discover before finalizing the note.

## 2025-07-02 ENCOUNTER — HOSPITAL ENCOUNTER (OUTPATIENT)
Dept: LAB | Facility: MEDICAL CENTER | Age: 45
End: 2025-07-02
Attending: FAMILY MEDICINE
Payer: COMMERCIAL

## 2025-07-02 DIAGNOSIS — Z13.228 SCREENING FOR ENDOCRINE, METABOLIC AND IMMUNITY DISORDER: ICD-10-CM

## 2025-07-02 DIAGNOSIS — Z13.220 LIPID SCREENING: ICD-10-CM

## 2025-07-02 DIAGNOSIS — E03.8 SUBCLINICAL HYPOTHYROIDISM: ICD-10-CM

## 2025-07-02 DIAGNOSIS — Z13.0 SCREENING FOR ENDOCRINE, METABOLIC AND IMMUNITY DISORDER: ICD-10-CM

## 2025-07-02 DIAGNOSIS — Z13.1 DIABETES MELLITUS SCREENING: ICD-10-CM

## 2025-07-02 DIAGNOSIS — Z13.29 SCREENING FOR ENDOCRINE, METABOLIC AND IMMUNITY DISORDER: ICD-10-CM

## 2025-07-02 DIAGNOSIS — Z13.0 SCREENING FOR DEFICIENCY ANEMIA: ICD-10-CM

## 2025-07-02 DIAGNOSIS — E66.811 OBESITY (BMI 30.0-34.9): ICD-10-CM

## 2025-07-02 LAB
ALBUMIN SERPL BCP-MCNC: 4.1 G/DL (ref 3.2–4.9)
ALBUMIN/GLOB SERPL: 1.6 G/DL
ALP SERPL-CCNC: 54 U/L (ref 30–99)
ALT SERPL-CCNC: 35 U/L (ref 2–50)
ANION GAP SERPL CALC-SCNC: 12 MMOL/L (ref 7–16)
AST SERPL-CCNC: 23 U/L (ref 12–45)
BILIRUB SERPL-MCNC: 0.3 MG/DL (ref 0.1–1.5)
BUN SERPL-MCNC: 13 MG/DL (ref 8–22)
CALCIUM ALBUM COR SERPL-MCNC: 8.8 MG/DL (ref 8.5–10.5)
CALCIUM SERPL-MCNC: 8.9 MG/DL (ref 8.5–10.5)
CHLORIDE SERPL-SCNC: 109 MMOL/L (ref 96–112)
CHOLEST SERPL-MCNC: 184 MG/DL (ref 100–199)
CO2 SERPL-SCNC: 20 MMOL/L (ref 20–33)
CREAT SERPL-MCNC: 0.7 MG/DL (ref 0.5–1.4)
ERYTHROCYTE [DISTWIDTH] IN BLOOD BY AUTOMATED COUNT: 40.6 FL (ref 35.9–50)
EST. AVERAGE GLUCOSE BLD GHB EST-MCNC: 111 MG/DL
GFR SERPLBLD CREATININE-BSD FMLA CKD-EPI: 110 ML/MIN/1.73 M 2
GLOBULIN SER CALC-MCNC: 2.5 G/DL (ref 1.9–3.5)
GLUCOSE SERPL-MCNC: 95 MG/DL (ref 65–99)
HBA1C MFR BLD: 5.5 % (ref 4–5.6)
HCT VFR BLD AUTO: 41.1 % (ref 37–47)
HDLC SERPL-MCNC: 52 MG/DL
HGB BLD-MCNC: 13.9 G/DL (ref 12–16)
LDLC SERPL CALC-MCNC: 114 MG/DL
MCH RBC QN AUTO: 29.8 PG (ref 27–33)
MCHC RBC AUTO-ENTMCNC: 33.8 G/DL (ref 32.2–35.5)
MCV RBC AUTO: 88 FL (ref 81.4–97.8)
PLATELET # BLD AUTO: 303 K/UL (ref 164–446)
PMV BLD AUTO: 10.6 FL (ref 9–12.9)
POTASSIUM SERPL-SCNC: 4.2 MMOL/L (ref 3.6–5.5)
PROT SERPL-MCNC: 6.6 G/DL (ref 6–8.2)
RBC # BLD AUTO: 4.67 M/UL (ref 4.2–5.4)
SODIUM SERPL-SCNC: 141 MMOL/L (ref 135–145)
T4 FREE SERPL-MCNC: 0.76 NG/DL (ref 0.93–1.7)
TRIGL SERPL-MCNC: 92 MG/DL (ref 0–149)
TSH SERPL-ACNC: 1.92 UIU/ML (ref 0.38–5.33)
WBC # BLD AUTO: 9.1 K/UL (ref 4.8–10.8)

## 2025-07-02 PROCEDURE — 84439 ASSAY OF FREE THYROXINE: CPT

## 2025-07-02 PROCEDURE — 80053 COMPREHEN METABOLIC PANEL: CPT

## 2025-07-02 PROCEDURE — 84443 ASSAY THYROID STIM HORMONE: CPT

## 2025-07-02 PROCEDURE — 36415 COLL VENOUS BLD VENIPUNCTURE: CPT

## 2025-07-02 PROCEDURE — 83036 HEMOGLOBIN GLYCOSYLATED A1C: CPT

## 2025-07-02 PROCEDURE — 80061 LIPID PANEL: CPT

## 2025-07-02 PROCEDURE — 85027 COMPLETE CBC AUTOMATED: CPT

## 2025-07-09 ENCOUNTER — APPOINTMENT (OUTPATIENT)
Dept: RADIOLOGY | Facility: MEDICAL CENTER | Age: 45
End: 2025-07-09
Attending: FAMILY MEDICINE
Payer: COMMERCIAL

## 2025-07-09 DIAGNOSIS — K11.8 MASS OF RIGHT SUBMANDIBULAR GLAND: ICD-10-CM

## 2025-07-09 PROCEDURE — 76536 US EXAM OF HEAD AND NECK: CPT

## 2025-07-16 ENCOUNTER — OFFICE VISIT (OUTPATIENT)
Dept: MEDICAL GROUP | Facility: PHYSICIAN GROUP | Age: 45
End: 2025-07-16
Payer: COMMERCIAL

## 2025-07-16 VITALS
RESPIRATION RATE: 16 BRPM | WEIGHT: 198 LBS | SYSTOLIC BLOOD PRESSURE: 122 MMHG | HEIGHT: 65 IN | TEMPERATURE: 97.7 F | HEART RATE: 99 BPM | DIASTOLIC BLOOD PRESSURE: 74 MMHG | OXYGEN SATURATION: 95 % | BODY MASS INDEX: 32.99 KG/M2

## 2025-07-16 DIAGNOSIS — E03.9 HYPOTHYROIDISM, UNSPECIFIED TYPE: ICD-10-CM

## 2025-07-16 DIAGNOSIS — R79.89 LOW THYROXINE (T4) LEVEL: ICD-10-CM

## 2025-07-16 DIAGNOSIS — R22.0 MASS OF SUBMANDIBULAR REGION: Primary | ICD-10-CM

## 2025-07-16 DIAGNOSIS — E66.811 OBESITY (BMI 30.0-34.9): ICD-10-CM

## 2025-07-16 PROBLEM — E03.8 SUBCLINICAL HYPOTHYROIDISM: Status: RESOLVED | Noted: 2023-06-16 | Resolved: 2025-07-16

## 2025-07-16 PROCEDURE — 3074F SYST BP LT 130 MM HG: CPT | Performed by: FAMILY MEDICINE

## 2025-07-16 PROCEDURE — 3078F DIAST BP <80 MM HG: CPT | Performed by: FAMILY MEDICINE

## 2025-07-16 PROCEDURE — 99214 OFFICE O/P EST MOD 30 MIN: CPT | Performed by: FAMILY MEDICINE

## 2025-07-16 RX ORDER — LEVOTHYROXINE SODIUM 25 UG/1
25 TABLET ORAL
Qty: 30 TABLET | Refills: 1 | Status: SHIPPED | OUTPATIENT
Start: 2025-07-16

## 2025-07-16 ASSESSMENT — FIBROSIS 4 INDEX: FIB4 SCORE: 0.54

## 2025-07-16 NOTE — PROGRESS NOTES
"Verbal consent was acquired by the patient to use [employer] ambient listening note generation during this visit PRAB[facility]    Subjective:     HPI:   History of Present Illness  The patient presents for a lipoma and hypothyroidism.    Lipoma  - The lipoma appeared suddenly one day and was quite large.  - She is curious about the potential causes of lipomas and whether they can resolve on their own.    Hypothyroidism  - Two years ago, she was prescribed a low dose of levothyroxine by Dr. Ayers to aid in weight loss, even though her lab results were normal at the time.  - She took the medication for a few months but did not notice any significant changes.    - She has been experiencing irregular bowel movements, with more instances of diarrhea, over the past year.    FAMILY HISTORY  - Strong family history of thyroid disease        Objective:     Exam:  /74 (BP Location: Right arm, Patient Position: Sitting, BP Cuff Size: Adult)   Pulse 99   Temp 36.5 °C (97.7 °F) (Temporal)   Resp 16   Ht 1.651 m (5' 5\")   Wt 89.8 kg (198 lb)   SpO2 95%   BMI 32.95 kg/m²  Body mass index is 32.95 kg/m².    Physical Exam  Vitals and nursing note reviewed.   Constitutional:       Appearance: Normal appearance.   HENT:      Head: Normocephalic and atraumatic.   Neck:     Cardiovascular:      Rate and Rhythm: Normal rate and regular rhythm.   Pulmonary:      Effort: Pulmonary effort is normal.      Breath sounds: Normal breath sounds.   Lymphadenopathy:      Head:      Right side of head: Submandibular adenopathy present.   Neurological:      Mental Status: She is alert. Mental status is at baseline.   Psychiatric:         Mood and Affect: Mood normal.             Results  IMPRESSION:     A 2.7 x 1.4 x 2.5 cm echogenic mass in the subcutaneous tissue in the right submandibular region. This could relate to a lipoma. Further evaluation with MRI with contrast is recommended for confirmation.    Assessment & Plan:     1. Mass of " submandibular region        2. Obesity (BMI 30.0-34.9)        3. Low thyroxine (T4) level  TSH    FREE THYROXINE    THYROID PEROXIDASE  (TPO) AB      4. Hypothyroidism, unspecified type  TSH    FREE THYROXINE    THYROID PEROXIDASE  (TPO) AB          Assessment & Plan  1. Lipoma: Probable.  - Ultrasound results suggest a probable lipoma.  - Maxillary MRI recommended for further evaluation  - Provided general radiology scheduling number to arrange the MRI.  - Even if MRI confirms lipoma - would still refer for removal, as it seemed to appear rather suddenly and is quite large, so would want sent for pathology    2. Hypothyroidism: Early-stage.  - Free T4 level is low at 0.76. TSH normal.   - Strong family history of thyroid disease.  - Symptoms discussed: low energy, constipation, hair loss, dry skin, cold intolerance, weight gain.  - Take levothyroxine on an empty stomach, ideally first thing in the morning, and wait 30 minutes before eating or taking other medications.  - Prescription for levothyroxine 25 mcg sent to the pharmacy, with instructions to take it daily. A month's supply with one refill provided.  - Thyroid labs to be rechecked in approximately 6 weeks to assess the need for dosage adjustment.    Follow-up  - Follow-up appointment scheduled for 7 weeks from now.          Return in about 7 weeks (around 9/3/2025).    Please note that this dictation was created using voice recognition software. I have made every reasonable attempt to correct obvious errors, but I expect that there are errors of grammar and possibly content that I did not discover before finalizing the note.

## 2025-07-16 NOTE — PATIENT INSTRUCTIONS
- call Radiology to schedule your MRI [phone]    - get repeat thyroid labs in approx. 6 weeks

## 2025-07-18 ENCOUNTER — APPOINTMENT (OUTPATIENT)
Dept: MEDICAL GROUP | Facility: PHYSICIAN GROUP | Age: 45
End: 2025-07-18
Payer: COMMERCIAL

## 2025-08-05 ENCOUNTER — TELEPHONE (OUTPATIENT)
Dept: MEDICAL GROUP | Facility: PHYSICIAN GROUP | Age: 45
End: 2025-08-05
Payer: COMMERCIAL

## 2025-08-05 DIAGNOSIS — R22.0 MASS OF RIGHT SUBMANDIBULAR REGION: Primary | ICD-10-CM

## 2025-08-12 RX ORDER — LEVOTHYROXINE SODIUM 25 UG/1
25 TABLET ORAL
Qty: 90 TABLET | Refills: 2 | Status: SHIPPED | OUTPATIENT
Start: 2025-08-12